# Patient Record
Sex: MALE | Race: WHITE | Employment: UNEMPLOYED | URBAN - METROPOLITAN AREA
[De-identification: names, ages, dates, MRNs, and addresses within clinical notes are randomized per-mention and may not be internally consistent; named-entity substitution may affect disease eponyms.]

---

## 2019-04-30 ENCOUNTER — APPOINTMENT (OUTPATIENT)
Dept: ULTRASOUND IMAGING | Facility: CLINIC | Age: 39
DRG: 194 | End: 2019-04-30
Attending: HOSPITALIST
Payer: COMMERCIAL

## 2019-04-30 ENCOUNTER — APPOINTMENT (OUTPATIENT)
Dept: GENERAL RADIOLOGY | Facility: CLINIC | Age: 39
DRG: 194 | End: 2019-04-30
Attending: EMERGENCY MEDICINE
Payer: COMMERCIAL

## 2019-04-30 ENCOUNTER — APPOINTMENT (OUTPATIENT)
Dept: CT IMAGING | Facility: CLINIC | Age: 39
DRG: 194 | End: 2019-04-30
Attending: EMERGENCY MEDICINE
Payer: COMMERCIAL

## 2019-04-30 ENCOUNTER — HOSPITAL ENCOUNTER (INPATIENT)
Facility: CLINIC | Age: 39
LOS: 4 days | Discharge: HOME | DRG: 194 | End: 2019-05-04
Attending: EMERGENCY MEDICINE | Admitting: HOSPITALIST
Payer: COMMERCIAL

## 2019-04-30 DIAGNOSIS — E86.0 DEHYDRATION: ICD-10-CM

## 2019-04-30 DIAGNOSIS — J18.9 PNEUMONIA OF RIGHT LOWER LOBE DUE TO INFECTIOUS ORGANISM: ICD-10-CM

## 2019-04-30 DIAGNOSIS — J18.9 COMMUNITY ACQUIRED PNEUMONIA OF RIGHT LUNG, UNSPECIFIED PART OF LUNG: Primary | ICD-10-CM

## 2019-04-30 DIAGNOSIS — R09.02 HYPOXIA: ICD-10-CM

## 2019-04-30 LAB
ALBUMIN UR-MCNC: NEGATIVE MG/DL
AMPHETAMINES UR QL SCN: NEGATIVE
ANION GAP SERPL CALCULATED.3IONS-SCNC: 7 MMOL/L (ref 3–14)
APPEARANCE UR: CLEAR
BARBITURATES UR QL: NEGATIVE
BASOPHILS # BLD AUTO: 0 10E9/L (ref 0–0.2)
BASOPHILS NFR BLD AUTO: 0.3 %
BENZODIAZ UR QL: NEGATIVE
BILIRUB UR QL STRIP: NEGATIVE
BUN SERPL-MCNC: 15 MG/DL (ref 7–30)
CALCIUM SERPL-MCNC: 8.6 MG/DL (ref 8.5–10.1)
CANNABINOIDS UR QL SCN: POSITIVE
CHLORIDE SERPL-SCNC: 106 MMOL/L (ref 94–109)
CO2 SERPL-SCNC: 27 MMOL/L (ref 20–32)
COCAINE UR QL: POSITIVE
COLOR UR AUTO: NORMAL
CORTIS SERPL-MCNC: 64.8 UG/DL (ref 4–22)
CREAT SERPL-MCNC: 1.28 MG/DL (ref 0.66–1.25)
D DIMER PPP FEU-MCNC: 0.7 UG/ML FEU (ref 0–0.5)
DIFFERENTIAL METHOD BLD: ABNORMAL
EOSINOPHIL # BLD AUTO: 0.1 10E9/L (ref 0–0.7)
EOSINOPHIL NFR BLD AUTO: 1.6 %
ERYTHROCYTE [DISTWIDTH] IN BLOOD BY AUTOMATED COUNT: 12.2 % (ref 10–15)
FLUAV+FLUBV AG SPEC QL: NEGATIVE
FLUAV+FLUBV AG SPEC QL: NEGATIVE
GFR SERPL CREATININE-BSD FRML MDRD: 69 ML/MIN/{1.73_M2}
GLUCOSE SERPL-MCNC: 91 MG/DL (ref 70–99)
GLUCOSE UR STRIP-MCNC: NEGATIVE MG/DL
HCT VFR BLD AUTO: 40.5 % (ref 40–53)
HGB BLD-MCNC: 14.1 G/DL (ref 13.3–17.7)
HGB UR QL STRIP: NEGATIVE
IMM GRANULOCYTES # BLD: 0 10E9/L (ref 0–0.4)
IMM GRANULOCYTES NFR BLD: 0.3 %
KETONES UR STRIP-MCNC: NEGATIVE MG/DL
LACTATE BLD-SCNC: 1.5 MMOL/L (ref 0.7–2)
LEUKOCYTE ESTERASE UR QL STRIP: NEGATIVE
LYMPHOCYTES # BLD AUTO: 2.3 10E9/L (ref 0.8–5.3)
LYMPHOCYTES NFR BLD AUTO: 25.4 %
MCH RBC QN AUTO: 32.6 PG (ref 26.5–33)
MCHC RBC AUTO-ENTMCNC: 34.8 G/DL (ref 31.5–36.5)
MCV RBC AUTO: 94 FL (ref 78–100)
MONOCYTES # BLD AUTO: 0.4 10E9/L (ref 0–1.3)
MONOCYTES NFR BLD AUTO: 4.9 %
NEUTROPHILS # BLD AUTO: 6 10E9/L (ref 1.6–8.3)
NEUTROPHILS NFR BLD AUTO: 67.5 %
NITRATE UR QL: NEGATIVE
NRBC # BLD AUTO: 0 10*3/UL
NRBC BLD AUTO-RTO: 0 /100
OPIATES UR QL SCN: NEGATIVE
PCP UR QL SCN: NEGATIVE
PH UR STRIP: 5.5 PH (ref 5–7)
PLATELET # BLD AUTO: 131 10E9/L (ref 150–450)
POTASSIUM SERPL-SCNC: 3.4 MMOL/L (ref 3.4–5.3)
PROCALCITONIN SERPL-MCNC: 0.05 NG/ML
RBC # BLD AUTO: 4.32 10E12/L (ref 4.4–5.9)
SODIUM SERPL-SCNC: 140 MMOL/L (ref 133–144)
SOURCE: NORMAL
SP GR UR STRIP: 1.02 (ref 1–1.03)
SPECIMEN SOURCE: NORMAL
T4 FREE SERPL-MCNC: 0.63 NG/DL (ref 0.76–1.46)
TSH SERPL DL<=0.005 MIU/L-ACNC: 0.34 MU/L (ref 0.4–4)
UROBILINOGEN UR STRIP-MCNC: NORMAL MG/DL (ref 0–2)
WBC # BLD AUTO: 9 10E9/L (ref 4–11)

## 2019-04-30 PROCEDURE — 99223 1ST HOSP IP/OBS HIGH 75: CPT | Mod: AI | Performed by: HOSPITALIST

## 2019-04-30 PROCEDURE — 71046 X-RAY EXAM CHEST 2 VIEWS: CPT

## 2019-04-30 PROCEDURE — 80307 DRUG TEST PRSMV CHEM ANLYZR: CPT | Performed by: EMERGENCY MEDICINE

## 2019-04-30 PROCEDURE — 82533 TOTAL CORTISOL: CPT | Performed by: HOSPITALIST

## 2019-04-30 PROCEDURE — 96375 TX/PRO/DX INJ NEW DRUG ADDON: CPT

## 2019-04-30 PROCEDURE — 84443 ASSAY THYROID STIM HORMONE: CPT | Performed by: HOSPITALIST

## 2019-04-30 PROCEDURE — 71260 CT THORAX DX C+: CPT

## 2019-04-30 PROCEDURE — 94640 AIRWAY INHALATION TREATMENT: CPT

## 2019-04-30 PROCEDURE — 25000125 ZZHC RX 250: Performed by: HOSPITALIST

## 2019-04-30 PROCEDURE — 25000128 H RX IP 250 OP 636: Performed by: EMERGENCY MEDICINE

## 2019-04-30 PROCEDURE — 12000000 ZZH R&B MED SURG/OB

## 2019-04-30 PROCEDURE — 25000128 H RX IP 250 OP 636: Performed by: HOSPITALIST

## 2019-04-30 PROCEDURE — 96367 TX/PROPH/DG ADDL SEQ IV INF: CPT

## 2019-04-30 PROCEDURE — 87205 SMEAR GRAM STAIN: CPT | Performed by: HOSPITALIST

## 2019-04-30 PROCEDURE — 40000274 ZZH STATISTIC RCP CONSULT EA 30 MIN

## 2019-04-30 PROCEDURE — 25000131 ZZH RX MED GY IP 250 OP 636 PS 637: Performed by: EMERGENCY MEDICINE

## 2019-04-30 PROCEDURE — 36415 COLL VENOUS BLD VENIPUNCTURE: CPT | Performed by: HOSPITALIST

## 2019-04-30 PROCEDURE — 84439 ASSAY OF FREE THYROXINE: CPT | Performed by: HOSPITALIST

## 2019-04-30 PROCEDURE — 25000125 ZZHC RX 250: Performed by: EMERGENCY MEDICINE

## 2019-04-30 PROCEDURE — 87804 INFLUENZA ASSAY W/OPTIC: CPT | Performed by: EMERGENCY MEDICINE

## 2019-04-30 PROCEDURE — 87040 BLOOD CULTURE FOR BACTERIA: CPT | Performed by: EMERGENCY MEDICINE

## 2019-04-30 PROCEDURE — 87070 CULTURE OTHR SPECIMN AEROBIC: CPT | Performed by: HOSPITALIST

## 2019-04-30 PROCEDURE — 81003 URINALYSIS AUTO W/O SCOPE: CPT | Performed by: EMERGENCY MEDICINE

## 2019-04-30 PROCEDURE — 85379 FIBRIN DEGRADATION QUANT: CPT | Performed by: EMERGENCY MEDICINE

## 2019-04-30 PROCEDURE — 99285 EMERGENCY DEPT VISIT HI MDM: CPT | Mod: 25

## 2019-04-30 PROCEDURE — 25000132 ZZH RX MED GY IP 250 OP 250 PS 637: Performed by: HOSPITALIST

## 2019-04-30 PROCEDURE — 25800030 ZZH RX IP 258 OP 636: Performed by: EMERGENCY MEDICINE

## 2019-04-30 PROCEDURE — 25000132 ZZH RX MED GY IP 250 OP 250 PS 637: Performed by: EMERGENCY MEDICINE

## 2019-04-30 PROCEDURE — 82024 ASSAY OF ACTH: CPT | Performed by: HOSPITALIST

## 2019-04-30 PROCEDURE — 84145 PROCALCITONIN (PCT): CPT | Performed by: EMERGENCY MEDICINE

## 2019-04-30 PROCEDURE — 85025 COMPLETE CBC W/AUTO DIFF WBC: CPT | Performed by: EMERGENCY MEDICINE

## 2019-04-30 PROCEDURE — 96365 THER/PROPH/DIAG IV INF INIT: CPT | Mod: 59

## 2019-04-30 PROCEDURE — 96361 HYDRATE IV INFUSION ADD-ON: CPT

## 2019-04-30 PROCEDURE — 80048 BASIC METABOLIC PNL TOTAL CA: CPT | Performed by: EMERGENCY MEDICINE

## 2019-04-30 PROCEDURE — 94640 AIRWAY INHALATION TREATMENT: CPT | Mod: 76

## 2019-04-30 PROCEDURE — 93970 EXTREMITY STUDY: CPT

## 2019-04-30 PROCEDURE — 40000275 ZZH STATISTIC RCP TIME EA 10 MIN

## 2019-04-30 PROCEDURE — 25800030 ZZH RX IP 258 OP 636: Performed by: HOSPITALIST

## 2019-04-30 PROCEDURE — 83605 ASSAY OF LACTIC ACID: CPT | Performed by: EMERGENCY MEDICINE

## 2019-04-30 RX ORDER — CEFTRIAXONE 2 G/1
2 INJECTION, POWDER, FOR SOLUTION INTRAMUSCULAR; INTRAVENOUS EVERY 24 HOURS
Status: DISCONTINUED | OUTPATIENT
Start: 2019-05-01 | End: 2019-05-03

## 2019-04-30 RX ORDER — LEVOTHYROXINE SODIUM 100 UG/1
200 TABLET ORAL
Status: DISCONTINUED | OUTPATIENT
Start: 2019-05-01 | End: 2019-05-04 | Stop reason: HOSPADM

## 2019-04-30 RX ORDER — IPRATROPIUM BROMIDE AND ALBUTEROL SULFATE 2.5; .5 MG/3ML; MG/3ML
3 SOLUTION RESPIRATORY (INHALATION) ONCE
Status: COMPLETED | OUTPATIENT
Start: 2019-04-30 | End: 2019-04-30

## 2019-04-30 RX ORDER — ACETAMINOPHEN 325 MG/1
650 TABLET ORAL EVERY 4 HOURS PRN
Status: DISCONTINUED | OUTPATIENT
Start: 2019-04-30 | End: 2019-05-04 | Stop reason: HOSPADM

## 2019-04-30 RX ORDER — SODIUM CHLORIDE 9 MG/ML
1000 INJECTION, SOLUTION INTRAVENOUS CONTINUOUS
Status: DISCONTINUED | OUTPATIENT
Start: 2019-04-30 | End: 2019-05-03

## 2019-04-30 RX ORDER — LEVOTHYROXINE SODIUM 200 UG/1
200 TABLET ORAL
COMMUNITY

## 2019-04-30 RX ORDER — AMOXICILLIN 250 MG
2 CAPSULE ORAL 2 TIMES DAILY PRN
Status: DISCONTINUED | OUTPATIENT
Start: 2019-04-30 | End: 2019-05-04 | Stop reason: HOSPADM

## 2019-04-30 RX ORDER — CEFTRIAXONE 2 G/1
2 INJECTION, POWDER, FOR SOLUTION INTRAMUSCULAR; INTRAVENOUS ONCE
Status: COMPLETED | OUTPATIENT
Start: 2019-04-30 | End: 2019-04-30

## 2019-04-30 RX ORDER — ONDANSETRON 4 MG/1
4 TABLET, ORALLY DISINTEGRATING ORAL EVERY 6 HOURS PRN
Status: DISCONTINUED | OUTPATIENT
Start: 2019-04-30 | End: 2019-05-04 | Stop reason: HOSPADM

## 2019-04-30 RX ORDER — ALBUTEROL SULFATE 0.83 MG/ML
3 SOLUTION RESPIRATORY (INHALATION)
Status: DISCONTINUED | OUTPATIENT
Start: 2019-04-30 | End: 2019-05-03

## 2019-04-30 RX ORDER — TESTOSTERONE CYPIONATE 200 MG/ML
200 INJECTION, SOLUTION INTRAMUSCULAR
COMMUNITY
Start: 2019-02-21

## 2019-04-30 RX ORDER — ONDANSETRON 2 MG/ML
4 INJECTION INTRAMUSCULAR; INTRAVENOUS EVERY 6 HOURS PRN
Status: DISCONTINUED | OUTPATIENT
Start: 2019-04-30 | End: 2019-05-04 | Stop reason: HOSPADM

## 2019-04-30 RX ORDER — IBUPROFEN 200 MG
200 TABLET ORAL EVERY 4 HOURS PRN
COMMUNITY

## 2019-04-30 RX ORDER — OXYCODONE AND ACETAMINOPHEN 5; 325 MG/1; MG/1
1 TABLET ORAL EVERY 4 HOURS PRN
Status: DISCONTINUED | OUTPATIENT
Start: 2019-04-30 | End: 2019-05-04 | Stop reason: HOSPADM

## 2019-04-30 RX ORDER — HYDROMORPHONE HYDROCHLORIDE 1 MG/ML
0.2 INJECTION, SOLUTION INTRAMUSCULAR; INTRAVENOUS; SUBCUTANEOUS
Status: DISCONTINUED | OUTPATIENT
Start: 2019-04-30 | End: 2019-05-04 | Stop reason: HOSPADM

## 2019-04-30 RX ORDER — SODIUM CHLORIDE 9 MG/ML
INJECTION, SOLUTION INTRAVENOUS CONTINUOUS
Status: DISCONTINUED | OUTPATIENT
Start: 2019-04-30 | End: 2019-05-01

## 2019-04-30 RX ORDER — GUAIFENESIN/DEXTROMETHORPHAN 100-10MG/5
10 SYRUP ORAL EVERY 4 HOURS PRN
Status: DISCONTINUED | OUTPATIENT
Start: 2019-04-30 | End: 2019-05-04 | Stop reason: HOSPADM

## 2019-04-30 RX ORDER — ACETAMINOPHEN 500 MG
1000 TABLET ORAL ONCE
Status: COMPLETED | OUTPATIENT
Start: 2019-04-30 | End: 2019-04-30

## 2019-04-30 RX ORDER — POLYETHYLENE GLYCOL 3350 17 G/17G
17 POWDER, FOR SOLUTION ORAL DAILY PRN
Status: DISCONTINUED | OUTPATIENT
Start: 2019-04-30 | End: 2019-05-04 | Stop reason: HOSPADM

## 2019-04-30 RX ORDER — NALOXONE HYDROCHLORIDE 0.4 MG/ML
.1-.4 INJECTION, SOLUTION INTRAMUSCULAR; INTRAVENOUS; SUBCUTANEOUS
Status: DISCONTINUED | OUTPATIENT
Start: 2019-04-30 | End: 2019-05-04 | Stop reason: HOSPADM

## 2019-04-30 RX ORDER — BISACODYL 10 MG
10 SUPPOSITORY, RECTAL RECTAL DAILY PRN
Status: DISCONTINUED | OUTPATIENT
Start: 2019-04-30 | End: 2019-05-04 | Stop reason: HOSPADM

## 2019-04-30 RX ORDER — LEVOTHYROXINE SODIUM 100 UG/1
100 TABLET ORAL
Status: DISCONTINUED | OUTPATIENT
Start: 2019-04-30 | End: 2019-04-30

## 2019-04-30 RX ORDER — ACETAMINOPHEN 650 MG/1
650 SUPPOSITORY RECTAL EVERY 4 HOURS PRN
Status: DISCONTINUED | OUTPATIENT
Start: 2019-04-30 | End: 2019-05-02

## 2019-04-30 RX ORDER — ONDANSETRON 4 MG/1
4 TABLET, ORALLY DISINTEGRATING ORAL ONCE
Status: COMPLETED | OUTPATIENT
Start: 2019-04-30 | End: 2019-04-30

## 2019-04-30 RX ORDER — AMOXICILLIN 250 MG
1 CAPSULE ORAL 2 TIMES DAILY PRN
Status: DISCONTINUED | OUTPATIENT
Start: 2019-04-30 | End: 2019-05-04 | Stop reason: HOSPADM

## 2019-04-30 RX ORDER — IPRATROPIUM BROMIDE AND ALBUTEROL SULFATE 2.5; .5 MG/3ML; MG/3ML
3 SOLUTION RESPIRATORY (INHALATION) EVERY 4 HOURS PRN
Status: DISCONTINUED | OUTPATIENT
Start: 2019-04-30 | End: 2019-05-03

## 2019-04-30 RX ORDER — HYDROCORTISONE 10 MG/1
TABLET ORAL
COMMUNITY

## 2019-04-30 RX ORDER — IOPAMIDOL 755 MG/ML
70 INJECTION, SOLUTION INTRAVASCULAR ONCE
Status: COMPLETED | OUTPATIENT
Start: 2019-04-30 | End: 2019-04-30

## 2019-04-30 RX ORDER — AZITHROMYCIN 500 MG/1
500 INJECTION, POWDER, LYOPHILIZED, FOR SOLUTION INTRAVENOUS EVERY 24 HOURS
Status: DISCONTINUED | OUTPATIENT
Start: 2019-04-30 | End: 2019-04-30

## 2019-04-30 RX ORDER — AZITHROMYCIN 500 MG/1
500 INJECTION, POWDER, LYOPHILIZED, FOR SOLUTION INTRAVENOUS ONCE
Status: COMPLETED | OUTPATIENT
Start: 2019-04-30 | End: 2019-04-30

## 2019-04-30 RX ORDER — FLUTICASONE PROPIONATE 50 MCG
1 SPRAY, SUSPENSION (ML) NASAL DAILY PRN
COMMUNITY

## 2019-04-30 RX ADMIN — GUAIFENESIN AND DEXTROMETHORPHAN 10 ML: 100; 10 SYRUP ORAL at 14:12

## 2019-04-30 RX ADMIN — OXYCODONE HYDROCHLORIDE AND ACETAMINOPHEN 1 TABLET: 5; 325 TABLET ORAL at 22:06

## 2019-04-30 RX ADMIN — SODIUM CHLORIDE: 9 INJECTION, SOLUTION INTRAVENOUS at 15:57

## 2019-04-30 RX ADMIN — HYDROCORTISONE SODIUM SUCCINATE 50 MG: 100 INJECTION, POWDER, FOR SOLUTION INTRAMUSCULAR; INTRAVENOUS at 21:35

## 2019-04-30 RX ADMIN — HYDROCORTISONE SODIUM SUCCINATE 100 MG: 100 INJECTION, POWDER, FOR SOLUTION INTRAMUSCULAR; INTRAVENOUS at 13:00

## 2019-04-30 RX ADMIN — ACETAMINOPHEN 650 MG: 325 TABLET, FILM COATED ORAL at 19:36

## 2019-04-30 RX ADMIN — SODIUM CHLORIDE, POTASSIUM CHLORIDE, SODIUM LACTATE AND CALCIUM CHLORIDE 1000 ML: 600; 310; 30; 20 INJECTION, SOLUTION INTRAVENOUS at 11:45

## 2019-04-30 RX ADMIN — IPRATROPIUM BROMIDE AND ALBUTEROL SULFATE 3 ML: .5; 3 SOLUTION RESPIRATORY (INHALATION) at 08:46

## 2019-04-30 RX ADMIN — ONDANSETRON 4 MG: 4 TABLET, ORALLY DISINTEGRATING ORAL at 08:44

## 2019-04-30 RX ADMIN — IOPAMIDOL 70 ML: 755 INJECTION, SOLUTION INTRAVENOUS at 11:22

## 2019-04-30 RX ADMIN — GUAIFENESIN AND DEXTROMETHORPHAN 10 ML: 100; 10 SYRUP ORAL at 19:36

## 2019-04-30 RX ADMIN — SODIUM CHLORIDE 1000 ML: 9 INJECTION, SOLUTION INTRAVENOUS at 09:34

## 2019-04-30 RX ADMIN — LEVOTHYROXINE SODIUM 100 MCG: 100 TABLET ORAL at 14:12

## 2019-04-30 RX ADMIN — CEFTRIAXONE SODIUM 2 G: 2 INJECTION, POWDER, FOR SOLUTION INTRAMUSCULAR; INTRAVENOUS at 12:15

## 2019-04-30 RX ADMIN — OXYCODONE HYDROCHLORIDE AND ACETAMINOPHEN 1 TABLET: 5; 325 TABLET ORAL at 14:12

## 2019-04-30 RX ADMIN — SODIUM CHLORIDE 1000 ML: 9 INJECTION, SOLUTION INTRAVENOUS at 10:05

## 2019-04-30 RX ADMIN — AZITHROMYCIN DIHYDRATE 500 MG: 500 INJECTION, POWDER, LYOPHILIZED, FOR SOLUTION INTRAVENOUS at 13:00

## 2019-04-30 RX ADMIN — SODIUM CHLORIDE 94 ML: 9 INJECTION, SOLUTION INTRAVENOUS at 11:28

## 2019-04-30 RX ADMIN — IPRATROPIUM BROMIDE AND ALBUTEROL SULFATE 3 ML: .5; 3 SOLUTION RESPIRATORY (INHALATION) at 19:58

## 2019-04-30 RX ADMIN — ACETAMINOPHEN 1000 MG: 500 TABLET, FILM COATED ORAL at 08:45

## 2019-04-30 ASSESSMENT — ENCOUNTER SYMPTOMS
RHINORRHEA: 0
FEVER: 1
WHEEZING: 1
SHORTNESS OF BREATH: 1
HEADACHES: 1
NAUSEA: 1
DIZZINESS: 1

## 2019-04-30 ASSESSMENT — ACTIVITIES OF DAILY LIVING (ADL)
ADLS_ACUITY_SCORE: 13
ADLS_ACUITY_SCORE: 13

## 2019-04-30 ASSESSMENT — MIFFLIN-ST. JEOR: SCORE: 1694.18

## 2019-04-30 NOTE — ED NOTES
MD notified of pt's dropping sats while lying down and temperature of 101.6 orally after receiving PO tylenol. Pt placed on 2 L NC.

## 2019-04-30 NOTE — ED PROVIDER NOTES
"  History     Chief Complaint:  Cough    HPI   Braden Izaguirre is a 38 year old male, current smoker, with history of thyroid disease, hypertension, hypercholesteremia, pituitary adenoma, diabetes, Cushing syndrome, MI amongst others as noted below, who presents alone for evaluation of multiple symptoms, including dry cough with associated chest pain and shortness of breath, nausea, generalized malaise, low grade fevers and wheezing for the last two days. Patient reports he began to experience dizziness this morning \"where I was falling over because I couldn't walk straight\", thus presented for evaluation.     Patient denies any runny nose.     Allergies:  No Known Drug Allergies     Medications:    Cortef  Ibuprofen  Levothyroxine  Percocet  Vitamin D    Past Medical History:    Cushing syndrome  Depression  Diabetes  Hypercholesterermia  Hypertension  MI  Pacemaker  Pituitary adenoma  PTSD  Thyroid disease  Vertigo    Past Surgical History:    Removal of kidney stone  Genital warts - removal   Implant automatic I,plantabl cardioverter defibrillator  Transphenoidal - pituitary adenoma resection    Family History:    Father - diabetes    Social History:  The patient was accompanied to the ED alone.  Smoking Status: Yes - current every day smoker  Smokeless Tobacco: No  Alcohol Use: Former - quit 3 years ago  Drug Use: Former - quit 3 years ago   Marital Status:  Single [1]     Review of Systems   Constitutional: Positive for fever.   HENT: Negative for rhinorrhea.    Respiratory: Positive for shortness of breath and wheezing.    Cardiovascular: Positive for chest pain.   Gastrointestinal: Positive for nausea.   Neurological: Positive for dizziness and headaches.   All other systems reviewed and are negative.      Physical Exam   Vitals:  Patient Vitals for the past 24 hrs:   BP Temp Temp src Pulse Resp SpO2 Height Weight   04/30/19 1230 94/60 -- -- 77 -- 100 % -- --   04/30/19 1215 99/64 -- -- 73 -- 100 % -- -- " "  04/30/19 1200 103/63 -- -- 79 -- 99 % -- --   04/30/19 1145 101/49 -- -- 80 -- 97 % -- --   04/30/19 1130 -- -- -- 96 -- -- -- --   04/30/19 1115 90/51 -- -- 80 -- 97 % -- --   04/30/19 1100 (!) 84/49 98.6  F (37  C) Oral 79 -- 97 % -- --   04/30/19 1045 98/51 -- -- 99 -- 99 % -- --   04/30/19 1030 98/59 -- -- 78 -- 96 % -- --   04/30/19 1015 96/62 -- -- 86 -- 96 % -- --   04/30/19 1000 99/70 -- -- 86 -- 96 % -- --   04/30/19 0945 -- -- -- -- -- 100 % -- --   04/30/19 0933 101/63 -- -- 91 -- 91 % -- --   04/30/19 0930 101/63 -- -- -- -- 91 % -- --   04/30/19 0918 -- 101.6  F (38.7  C) Oral -- -- 96 % -- --   04/30/19 0915 -- -- -- -- -- (!) 88 % -- --   04/30/19 0910 -- -- -- -- -- 94 % -- --   04/30/19 0900 -- -- -- -- -- 93 % -- --   04/30/19 0827 119/63 100.1  F (37.8  C) Oral 114 18 98 % 1.651 m (5' 5\") 85.7 kg (189 lb)      Physical Exam  Nursing note and vitals reviewed.  Constitutional:  Alert.  Appears well-developed and well-nourished, comfortable.   HENT:   Nose:    Nose normal  Mouth/Throat:  Oropharynx normal except mouth is dry.  Eyes:    Conjunctivae are normal.      Right eye exhibits no discharge. Left eye exhibits no discharge.   Cardiovascular:  Normal rate, regular rhythm.      Normal heart sounds.     No murmur heard.  Pulmonary/Chest:  Breath sounds normal. No respiratory distress.     No tenderness. No stridor.   GI:   No tenderness, no distention.  Lymph:   Normal without enlargement in the cervical chain  Neurological:   Alert and appropriate. No focal weakness.  Gait is normal.  Musculoskeletal: No focal weakness, no edema.  Skin:    Skin is warm and dry. No rash noted. No diaphoresis.      No erythema. No pallor.   Psychiatric:   Behavior is normal. Judgment and thought content normal.      Emergency Department Course     Imaging:  Radiology findings were communicated with the patient who voiced understanding of the findings.  XR Chest:  IMPRESSION: Heart size is normal. No pleural " effusion, pneumothorax,  or abnormal area of consolidation. Dual lead pacemaker in the left  hemithorax.  Reading per radiology.     CT Chest Pulmonary Embolism w Contrast:  IMPRESSION:  1. Probable right lower lobe pneumonia. Aspiration pneumonitis is  another possibility.  2. No evidence of pulmonary embolism or acute thoracic aortic  abnormality.  Reading per radiology.     Laboratory:  Laboratory findings were communicated with the patient who voiced understanding of the findings.  Influenza A/B Antigen: Negative    CBC:  (L) o/w WNL (WBC 9.0, HGB 14.1)  BMP: Creatinine 1.28 (H) o/w WNL  Lactic Acid (Collected at 0926): 1.5   Blood Cultures: Pending  D Dimer (Collected 0926): 0.7   Procalcitonin: Pending    UA reflex to Microscopic and Culture: [facility]  Drug abuse scree urine: Cannabinoids Qual Urine Positive (A), Cocaine Qual Urine Positive (A) o/w WNL     Interventions:  0844 Zofran 4 mg PO  0845 Tylenol 1000 mg PO  0846 Duoneb 3 mL Nebulization  0934 0.9% NaCl Bolus 1000 mL IV  1005 0.9% NaCl Infusion 1000 mL IV  1145 Lactated Ringers Bolus 1000 mL IV  1215 Rocephin 2 g IV  1300 Zithromax 500 mg IV  1300 Solu-cortef 100 mg IV     Emergency Department Course:  Nursing notes and vitals reviewed.  A nasal swab was obtained for laboratory testing, findings above.    The patient was sent for a XR Chest,CT Chest Pulmonary Embolism w Contrast while in the emergency department, results above.    IV was inserted and blood was drawn for laboratory testing, results above.   The patient provided a urine sample here in the emergency department. This was sent for laboratory testing, findings above.     0821   I performed an exam of the patient as documented above. History obtained from patient.     0919   RN updates that patient's O2 sats are dropping. Will order chest x-ray. Also reported that patient endorsed some black stools, though did not mention this during my initial interview with patient.    0944   Updated  patient regarding negative flu test. Will pursue further work up.    1111   Updated patient regarding results. CT is indicated.    1201   Updated patient regarding results. Discussed plan of care and patient is agreeable to admission. Patient reports some improvement with fluids.     1209   I spoke with Dr. Hopkins of the Hospitalist service regarding patient's presentation, findings, and plan of care. They will accept patient for further care and evaluation.     I discussed the treatment plan with the patient. They expressed understanding of this plan and consented to admission. I discussed the patient with Dr. Hopkins, who will admit the patient to a monitored bed for further evaluation and treatment.     I personally reviewed the laboratory results with the Patient and answered all related questions prior to admission.    Impression & Plan      Medical Decision Making:  He comes in feeling weak, lightheaded and coughing.  I thought initially probably had influenza, but influenza came back negative.  Labs were obtained including blood cultures and a lactic acid.  His white count is normal with a normal differential, lactic acid is normal, basic metabolic panel is normal.  Initial chest x-ray was unremarkable.  This did not explain his hypoxia with his saturations in the upper 80s on room air.  He states he has not taken any narcotics today.  I did get a d-dimer at the off chance this could be secondary to a blood clot and was slightly elevated at 0.7.  CT scan of the chest was then obtained and showed no blood clot, but does have a right lower lobe infiltrate.  This would explain his clinical picture.  I started Zithromax and Rocephin and the patient will be admitted for further work-up.  Interestingly his blood pressures dropped into the upper 80s at one-point.  He was given 2 L of fluids and now his pressures are in the low 100s.  His third liter is going in and he still has not urinated.  He seems to be quite  dehydrated and his lightheadedness has now resolved and is feeling overall better.  He is still requiring oxygen.  With the DuoNeb, he said did not really help.  I have admitted the patient Dr. Hopkins.  With the patient's history of Cushing's disease, he was given a stress dose of Solu-Cortef 100 mg IV.    Diagnosis:    ICD-10-CM    1. Pneumonia of right lower lobe due to infectious organism (H) J18.1 Procalcitonin     Procalcitonin     CANCELED: Procalcitonin   2. Hypoxia R09.02    3. Dehydration E86.0         Disposition:   Admission.  IV antibiotics, oxygen, fluids.    Scribe Disclosure:  Iman WOODALL, am serving as a scribe at 8:54 AM on 4/30/2019 to document services personally performed by Karis cM MD, based on my observations and the provider's statements to me.  4/30/2019    EMERGENCY DEPARTMENT       Karis Mc MD  04/30/19 9843

## 2019-04-30 NOTE — ED NOTES
"[facility]  ED Nurse Handoff Report    ED Chief complaint: Cough (cough and hoarseness since last night now feeling dizzy)      ED Diagnosis:   Final diagnoses:   Pneumonia of right lower lobe due to infectious organism (H)   Hypoxia   Dehydration       Code Status: Full Code    Allergies: No Known Allergies    Activity level - Baseline/Home:  Independent    Activity Level - Current:   Independent     Needed?: No    Isolation: No  Infection: Not Applicable  Bariatric?: No    Vital Signs:   Vitals:    04/30/19 1115 04/30/19 1130 04/30/19 1145 04/30/19 1200   BP: 90/51  101/49 103/63   Pulse: 80 96 80 79   Resp:       Temp:       TempSrc:       SpO2: 97%  97% 99%   Weight:       Height:           Cardiac Rhythm: ,        Pain level: 0-10 Pain Scale: 3    Is this patient confused?: No   Does this patient have a guardian?  No         If yes, is there guardianship documents in the Epic \"Code/ACP\" activity?  N/A         Guardian Notified?  N/A  [place] - Suicide Severity Rating Scale Completed?  Yes  If yes, what color did the patient score?  White    Patient Report: Initial Complaint: cough- Pt arrived to the ED from home after developing a cough and fever on Sunday. Pt reports fever worse overnight.   Focused Assessment: Resp- cough. Pt dropped o2 saturations when lying flat. Pt plaed on 2L nc and sats mid 90s  Cardiac- Pt initially tachycardic and hypotensive. Pt received 3L fluids- 2L NS and 1L LR. Pt has pacemaker in place  Neuro- WNL  GI- initially nauseated  -WNL  Tests Performed: labs, BCx2, cxr, CT of chest UA  Abnormal Results: Ct showed pneumonia.   Treatments provided: IVF as above, rocephin and zithromax    Family Comments:     OBS brochure/video discussed/provided to patient/family: N/A              Name of person given brochure if not patient:               Relationship to patient:     ED Medications:   Medications   0.9% sodium chloride BOLUS (0 mLs Intravenous Stopped " 4/30/19 1004)     Followed by   sodium chloride 0.9% infusion (0 mLs Intravenous Stopped 4/30/19 1145)   azithromycin (ZITHROMAX) 500 mg vial to attach to  mL bag (has no administration in time range)   cefTRIAXone (ROCEPHIN) 2 g vial to attach to  ml bag for ADULTS or NS 50 ml bag for PEDS (2 g Intravenous New Bag 4/30/19 1215)   ondansetron (ZOFRAN-ODT) ODT tab 4 mg (4 mg Oral Given 4/30/19 0844)   acetaminophen (TYLENOL) tablet 1,000 mg (1,000 mg Oral Given 4/30/19 0845)   ipratropium - albuterol 0.5 mg/2.5 mg/3 mL (DUONEB) neb solution 3 mL (3 mLs Nebulization Given 4/30/19 0846)   lactated ringers BOLUS 1,000 mL (1,000 mLs Intravenous New Bag 4/30/19 1145)   iopamidol (ISOVUE-370) solution 70 mL (70 mLs Intravenous Given 4/30/19 1122)   Saline Flush (94 mLs Intravenous Given 4/30/19 1128)       Drips infusing?:  Yes    For the majority of the shift this patient was Green.   Interventions performed were .    Severe Sepsis OR Septic Shock Diagnosis Present: No    To be done/followed up on inpatient unit:      ED NURSE PHONE NUMBER: *[phone]

## 2019-04-30 NOTE — PLAN OF CARE
DATE & TIME: 4/30/19 [id number]  ORIENTATION: A/Ox4  BEHAVIOR & AGGRESSION TOOL COLOR: Green  CIWA SCORE: NA  ABNL VS/O2: VSS; BP running low, last /57, OBP negative. Pt does c/o dizziness with standing, but overall feelings better than this morning. Other VSS on RA. Infrequent cough, congested nonproductive. I/S educated and encouraged.  MOBILITY: Up SBA, uses urinal at bedside/ambulated to bathroom.   PAIN MANAGMENT: Sternal pain decreased to 4/10 with percocet.  DIET: Low Na no caffeine  BOWEL/BLADDER: Uses urinal at bedside/bathroom. 1 watery brown stool this evening.  ABNL LAB/BG: D-dimer 0.7, US neg for DVT, T4 Free 0.63, TSH 0.34, cortisol serum 64.8, cocaine and cannabinoid positive.  DRAIN/DEVICES: PIV IVF NS at 75ml/hr.  TELEMETRY RHYTHM: NSR  SKIN: Intact  TESTS/PROCEDURES:   D/C DAY/GOALS/PLACE: Pending  OTHER IMPORTANT INFO: Sputum to be collected, no production yet.

## 2019-04-30 NOTE — PROGRESS NOTES
RECEIVING UNIT ED HANDOFF REVIEW    ED Nurse Handoff Report was reviewed by: Brittaney Menjivar on April 30, 2019 at 12:43 PM

## 2019-04-30 NOTE — PROVIDER NOTIFICATION
MD Notification    Notified Person: MD    Notified Person Name: Sandeep    Notification Date/Time: 4/30/19 1400    Notification Interaction: Talked to MD    Purpose of Notification: Constant sharp sternum pain rated 8/10, worse with coughing    Orders Received: If worse with coughing/pressing on sternum, administer pain medication, no concern for heart    Comments: Pt states pain is worse with coughing, no change with pressing on sternum, nurse administered pain medications/robitussin.

## 2019-04-30 NOTE — PLAN OF CARE
Admission    Patient arrives to room 637-2 via cart from ED.  Care plan note: DATE & TIME: 4/30/19 1430  ORIENTATION: A/Ox4  BEHAVIOR & AGGRESSION TOOL COLOR: Green  CIWA SCORE: NA  ABNL VS/O2: VSS; BP running low, last /66, OBP negative. Pt does c/o dizziness with standing. Other VSS on 2L at 95%. Infrequent cough, congested nonproductive.  MOBILITY: Up SBA, uses urinal at bedside.  PAIN MANAGMENT: Percocet for sternal pain rated 7-8/10, MD aware of this pain which is worse with coughing, no concern for heart.   DIET: Low Na no caffeine  BOWEL/BLADDER: Uses urinal at bedside, last BM this AM per pt.  ABNL LAB/BG: D-dimer 0.7, US ordered to r/u DVT  DRAIN/DEVICES: PIV IVF NS at 75ml/hr.  TELEMETRY RHYTHM: SR with PVCs.  SKIN: Intact  TESTS/PROCEDURES: US ordered to r/u DVT  D/C DAY/GOALS/PLACE: Pending  OTHER IMPORTANT INFO: Sputum to be collected, no production yet.        Inpatient nursing criteria listed below were met:    PCD's Documented: Yes  Skin issues/needs documented :NA  Isolation education started/completed NA  Patient allergies verified with patient: Yes  Verified completion of [place] Risk Assessment Tool:  Yes  Verified completion of Guardianship screening tool: Yes  Fall Prevention: Care plan updated, Education given and documented Yes  Care Plan initiated: Yes  Home medications documented in belongings flowsheet: NA  Patient belongings documented in belongings flowsheet: Yes  Reminder note (belongings/ medications) placed in discharge instructions:NA  Admission profile/ required documentation complete: Yes

## 2019-04-30 NOTE — H&P
[facility]    History and Physical  Hospitalist       Date of Admission:  4/30/2019  Date of Service (when I saw the patient): 04/30/19    Assessment & Plan   Braden Izaguirre is a 38 year old male who presents with cough.  Admitted for further evaluation and treatment.    Suspected community-acquired pneumonia: Patient reporting cough on admission, in addition to associated chest pain with coughing, dry throat, shortness of breath, nausea, malaise, fatigue, low-grade fever for the past 2 days prior to admission, as well as lightheadedness and near syncope during exertion.  Lactic acid is normal at 1.5 on admission.  White blood cell count is normal at 9.0 on admission.  Influenza A and B testing initially negative.  Chest x-ray was completed showing no evidence for pleural effusion, pneumothorax, or abnormal area of consolidation, however, dual-lead pacemaker in the left hemithorax noted.  CT of the chest reveals probable right lower lobe pneumonia, aspiration pneumonitis is another possibility, per report, no evidence of pulmonary embolism or acute thoracic aortic abnormality, per report.  -Blood cultures x2 pending.  -Procalcitonin pending.  -Initiated on ceftriaxone and azithromycin in the emergency department.  -Pulmonary hygiene.  -Close hemodynamic monitoring.  -IV fluids.    Presyncope: Patient reporting dizziness on standing.   -IVF.   -Consider CT head pending clinical course.   -Orthostatics, consider echo as clinically indicated.     Cardiac, dual-lead pacemaker: Patient with chest x-ray identification of dual-lead pacemaker in the left hemithorax.  -Monitor closely.    H/o Drug Abuse: Patient with drug abuse history.   -UDS pending.   -Consider CD consult.     Thrombocytopenia: Patient with a count of 131 on admission.  No reports of external blood loss.  -Monitor.    Acute kidney injury, stage I: Patient with a creatinine of 1.28 on admission.  -Avoid nephrotoxins.  -Close  monitoring.    Positive d-dimer: Patient with d-dimer of 0.7 on admission.  CTA of the chest completed with no evidence for pulmonary embolism.  -Bilateral lower extremity ultrasound to rule out DVT.    H/o Cushings: Patient maintained on hydrocortisone prior to admission.   -Cortisol level.  -ACTH  -Hold prior to admission hydrocortisone.   -AM Cortisol.  -Stress dose steroids for 24 hours then resume home dosing as clinically indicated.     Hypothyroidism: Patient maintained on levothyroxine as an outpatient.  -TSH with reflex free T4.  -Continue prior to admission levothyroxine.    DVT Prophylaxis: Pneumatic Compression Devices  Code Status: Full Code    Disposition: Inpatient status.    Dr. Trell Hopkins D.O.  [facility] Hospitalist  Pager [phone]    Primary Care Physician   Pranav Pagan    Chief Complaint   Cough    History is obtained from the patient and medical records.     History of Present Illness   Braden Izaguirre is a 38 year old male who presents with cough.  Admitted for further evaluation and treatment. Patient reporting cough on admission, in addition to associated chest pain with coughing, dry throat, shortness of breath, nausea, malaise, fatigue, low-grade fever for the past 2 days prior to admission, as well as lightheadedness and near syncope during exertion.  Lactic acid is normal at 1.5 on admission.  White blood cell count is normal at 9.0 on admission.  Influenza a and B testing initially negative.  Chest x-ray was completed showing no evidence for pleural effusion, pneumothorax, or abnormal area of consolidation, however, dual-lead pacemaker in the left hemithorax noted.  CT of the chest reveals probable right lower lobe pneumonia, aspiration pneumonitis is another possibility, per report, no evidence of pulmonary embolism or acute thoracic aortic abnormality, per report. Patient states he has felt very lightheaded when standing up.     Past Medical History    I have reviewed  this patient's medical history and updated it with pertinent information if needed.   Past Medical History:   Diagnosis Date     Cushing syndrome (H)      Depression      Diabetes (H)      Hypercholesteremia      Hypertension      Myocardial infarction (H)      Pacemaker      Pituitary adenoma (H)      Post-traumatic stress syndrome      Thyroid disease      Vertigo        Past Surgical History   I have reviewed this patient's surgical history and updated it with pertinent information if needed.  Past Surgical History:   Procedure Laterality Date     C REMOVAL OF KIDNEY STONE       genital warts      removal     IMPLANT AUTOMATIC IMPLANTABLE CARDIOVERTER DEFIBRILLATOR       transphenoidal      pituitary adenoma resection       Prior to Admission Medications   Prior to Admission Medications   Prescriptions Last Dose Informant Patient Reported? Taking?   hydrocortisone (CORTEF) 10 MG tablet   Yes Yes   Sig: Take 10 mg by mouth daily   ibuprofen (ADVIL/MOTRIN) 200 MG tablet   Yes Yes   Sig: Take 200 mg by mouth every 4 hours as needed for mild pain   levothyroxine (SYNTHROID/LEVOTHROID) 100 MCG tablet   Yes Yes   Sig: Take 100 mcg by mouth daily   oxyCODONE-acetaminophen (PERCOCET) 5-325 MG per tablet   Yes No   Sig: Take  by mouth every 4 hours as needed.   vitamin D (ERGOCALCIFEROL) 42260 UNIT capsule   Yes No   Sig: Take 50,000 Units by mouth once a week.      Facility-Administered Medications: None     Allergies   No Known Allergies    Social History   I have reviewed this patient's social history and updated it with pertinent information if needed. Braden Izaguirre  reports that he has been smoking.  He has a 10.00 pack-year smoking history. He has never used smokeless tobacco. He reports that he does not drink alcohol or use drugs.    Family History   I have reviewed this patient's family history and updated it with pertinent information if needed.   Family History   Problem Relation Age of Onset     Diabetes Father         Review of Systems   The 10 point Review of Systems is negative other than noted in the HPI or here.     Physical Exam   Temp: 98.6  F (37  C) Temp src: Oral BP: 101/49 Pulse: 80   Resp: 18 SpO2: 97 % O2 Device: Nasal cannula Oxygen Delivery: 2 LPM  Vital Signs with Ranges  Temp:  [98.6  F (37  C)-101.6  F (38.7  C)] 98.6  F (37  C)  Pulse:  [] 80  Resp:  [18] 18  BP: ()/(49-70) 101/49  SpO2:  [88 %-100 %] 97 %  189 lbs 0 oz    GENERAL: Alert and oriented. NAD. Conversational, appropriate. Cushingoid appearance.   HEENT: Normocephalic. EOMI. No icterus or injection. Nares normal.   LUNGS: Decrement to bases. No dyspnea at rest. NC in place.   HEART: Regular rate. Extremities perfused.   ABDOMEN: Soft, nontender, and nondistended. Positive bowel sounds.   EXTREMITIES: No LE edema noted.   NEUROLOGIC: Moves extremities x4 on command. No acute focal neurologic abnormalities noted.     Data   Data reviewed today:  I personally reviewed both laboratory and imaging data.   Recent Labs   Lab 04/30/19  0926   WBC 9.0   HGB 14.1   MCV 94   *      POTASSIUM 3.4   CHLORIDE 106   CO2 27   BUN 15   CR 1.28*   ANIONGAP 7   DIGNA 8.6   GLC 91       Recent Results (from the past 24 hour(s))   XR Chest 2 Views    Narrative    CHEST TWO VIEWS April 30, 2019 9:55 AM     HISTORY: 38-year-old patient with cough, fever, and hypoxia.    COMPARISON: None.       Impression    IMPRESSION: Heart size is normal. No pleural effusion, pneumothorax,  or abnormal area of consolidation. Dual lead pacemaker in the left  hemithorax.    SALONI PAGE MD   CT Chest Pulmonary Embolism w Contrast    Narrative    CT CHEST PULMONARY EMBOLISM WITH CONTRAST   4/30/2019 11:31 AM     HISTORY: Fever, hypoxia, normal chest x-ray, mildly elevated D-dimer.    TECHNIQUE: 70 mL Isovue-370. Radiation dose for this scan was reduced  using automated exposure control, adjustment of the mA and/or kV  according to patient size, or iterative  reconstruction technique.    COMPARISON: Chest x-ray obtained same day.    FINDINGS: No evidence of pulmonary embolism or acute thoracic aortic  abnormality. Heart size is normal. No significant coronary  calcifications. There is a left-sided pacer device present.    No pneumothorax. No pleural or pericardial effusion. There is abnormal  airspace and groundglass opacity in the right lower lobe. No  pathologically enlarged thoracic or axillary lymph nodes. No pulmonary  nodule or mass.      Impression    IMPRESSION:  1. Probable right lower lobe pneumonia. Aspiration pneumonitis is  another possibility.  2. No evidence of pulmonary embolism or acute thoracic aortic  abnormality.    WHITNEY JOHNSON MD

## 2019-04-30 NOTE — PHARMACY-ADMISSION MEDICATION HISTORY
Admission medication history interview status for the 4/30/2019  admission is complete. See EPIC admission navigator for prior to admission medications     Medication history source reliability:Good    Actions taken by pharmacist (provider contacted, etc):None     Additional medication history information not noted on PTA med list :Patient interview and pharmacy fill history at [facility] ([phone])    Medication reconciliation/reorder completed by provider prior to medication history? Yes    Time spent in this activity: 5    Prior to Admission medications    Medication Sig Last Dose Taking? Auth Provider   fluticasone (FLONASE) 50 MCG/ACT nasal spray Spray 1 spray into both nostrils daily as needed for rhinitis or allergies  Yes Unknown, Entered By History   hydrocortisone (CORTEF) 10 MG tablet Take four tablets (40 mg) by mouth every morning and two (20 mg) every afternoon at 4 pm. 4/29/2019 at PM Yes Reported, Patient   ibuprofen (ADVIL/MOTRIN) 200 MG tablet Take 200 mg by mouth every 4 hours as needed for mild pain  at PRN Yes Reported, Patient   levothyroxine (SYNTHROID/LEVOTHROID) 200 MCG tablet Take 200 mcg by mouth every morning (before breakfast)  4/29/2019 at Unknown time Yes Reported, Patient   testosterone cypionate (DEPO-TESTOSTERONE) 200 MG/ML injection Inject 200 mg into the muscle every 14 days 4/26/2019 Yes Unknown, Entered By History

## 2019-05-01 LAB
ACTH PLAS-MCNC: <10 PG/ML
ALBUMIN SERPL-MCNC: 3.1 G/DL (ref 3.4–5)
ALP SERPL-CCNC: 61 U/L (ref 40–150)
ALT SERPL W P-5'-P-CCNC: 37 U/L (ref 0–70)
ANION GAP SERPL CALCULATED.3IONS-SCNC: 10 MMOL/L (ref 3–14)
AST SERPL W P-5'-P-CCNC: 26 U/L (ref 0–45)
BASOPHILS # BLD AUTO: 0 10E9/L (ref 0–0.2)
BASOPHILS NFR BLD AUTO: 0.3 %
BILIRUB SERPL-MCNC: 0.2 MG/DL (ref 0.2–1.3)
BUN SERPL-MCNC: 10 MG/DL (ref 7–30)
CALCIUM SERPL-MCNC: 7.9 MG/DL (ref 8.5–10.1)
CHLORIDE SERPL-SCNC: 109 MMOL/L (ref 94–109)
CO2 SERPL-SCNC: 23 MMOL/L (ref 20–32)
CORTIS SERPL-MCNC: 36.6 UG/DL (ref 4–22)
CREAT SERPL-MCNC: 0.93 MG/DL (ref 0.66–1.25)
DIFFERENTIAL METHOD BLD: ABNORMAL
EOSINOPHIL # BLD AUTO: 0 10E9/L (ref 0–0.7)
EOSINOPHIL NFR BLD AUTO: 0 %
ERYTHROCYTE [DISTWIDTH] IN BLOOD BY AUTOMATED COUNT: 12.5 % (ref 10–15)
GFR SERPL CREATININE-BSD FRML MDRD: >90 ML/MIN/{1.73_M2}
GLUCOSE SERPL-MCNC: 194 MG/DL (ref 70–99)
GRAM STN SPEC: ABNORMAL
HCT VFR BLD AUTO: 32.5 % (ref 40–53)
HGB BLD-MCNC: 11.3 G/DL (ref 13.3–17.7)
IMM GRANULOCYTES # BLD: 0 10E9/L (ref 0–0.4)
IMM GRANULOCYTES NFR BLD: 0.4 %
LYMPHOCYTES # BLD AUTO: 1 10E9/L (ref 0.8–5.3)
LYMPHOCYTES NFR BLD AUTO: 14.8 %
Lab: ABNORMAL
MCH RBC QN AUTO: 33 PG (ref 26.5–33)
MCHC RBC AUTO-ENTMCNC: 34.8 G/DL (ref 31.5–36.5)
MCV RBC AUTO: 95 FL (ref 78–100)
MONOCYTES # BLD AUTO: 0.4 10E9/L (ref 0–1.3)
MONOCYTES NFR BLD AUTO: 5.1 %
NEUTROPHILS # BLD AUTO: 5.5 10E9/L (ref 1.6–8.3)
NEUTROPHILS NFR BLD AUTO: 79.4 %
NRBC # BLD AUTO: 0 10*3/UL
NRBC BLD AUTO-RTO: 0 /100
PLATELET # BLD AUTO: 124 10E9/L (ref 150–450)
POTASSIUM SERPL-SCNC: 3.3 MMOL/L (ref 3.4–5.3)
POTASSIUM SERPL-SCNC: 4 MMOL/L (ref 3.4–5.3)
PROT SERPL-MCNC: 5.8 G/DL (ref 6.8–8.8)
RBC # BLD AUTO: 3.42 10E12/L (ref 4.4–5.9)
SODIUM SERPL-SCNC: 142 MMOL/L (ref 133–144)
SPECIMEN SOURCE: ABNORMAL
WBC # BLD AUTO: 6.9 10E9/L (ref 4–11)

## 2019-05-01 PROCEDURE — 36415 COLL VENOUS BLD VENIPUNCTURE: CPT | Performed by: HOSPITALIST

## 2019-05-01 PROCEDURE — 25000128 H RX IP 250 OP 636: Performed by: HOSPITALIST

## 2019-05-01 PROCEDURE — 84132 ASSAY OF SERUM POTASSIUM: CPT | Performed by: HOSPITALIST

## 2019-05-01 PROCEDURE — 94640 AIRWAY INHALATION TREATMENT: CPT

## 2019-05-01 PROCEDURE — 99232 SBSQ HOSP IP/OBS MODERATE 35: CPT | Performed by: HOSPITALIST

## 2019-05-01 PROCEDURE — 94640 AIRWAY INHALATION TREATMENT: CPT | Mod: 76

## 2019-05-01 PROCEDURE — 99207 ZZC CDG-MDM COMPONENT: MEETS LOW - DOWN CODED: CPT | Performed by: HOSPITALIST

## 2019-05-01 PROCEDURE — 85025 COMPLETE CBC W/AUTO DIFF WBC: CPT | Performed by: HOSPITALIST

## 2019-05-01 PROCEDURE — 94799 UNLISTED PULMONARY SVC/PX: CPT

## 2019-05-01 PROCEDURE — 82533 TOTAL CORTISOL: CPT | Performed by: HOSPITALIST

## 2019-05-01 PROCEDURE — 40000275 ZZH STATISTIC RCP TIME EA 10 MIN

## 2019-05-01 PROCEDURE — 12000000 ZZH R&B MED SURG/OB

## 2019-05-01 PROCEDURE — 25800030 ZZH RX IP 258 OP 636: Performed by: HOSPITALIST

## 2019-05-01 PROCEDURE — 25000132 ZZH RX MED GY IP 250 OP 250 PS 637: Performed by: HOSPITALIST

## 2019-05-01 PROCEDURE — 25000125 ZZHC RX 250: Performed by: HOSPITALIST

## 2019-05-01 PROCEDURE — 80053 COMPREHEN METABOLIC PANEL: CPT | Performed by: HOSPITALIST

## 2019-05-01 RX ORDER — HYDROCORTISONE 20 MG/1
40 TABLET ORAL DAILY
Status: DISCONTINUED | OUTPATIENT
Start: 2019-05-02 | End: 2019-05-04 | Stop reason: HOSPADM

## 2019-05-01 RX ORDER — HYDROCORTISONE 20 MG/1
20 TABLET ORAL EVERY 24 HOURS
Status: DISCONTINUED | OUTPATIENT
Start: 2019-05-02 | End: 2019-05-04 | Stop reason: HOSPADM

## 2019-05-01 RX ADMIN — GUAIFENESIN AND DEXTROMETHORPHAN 10 ML: 100; 10 SYRUP ORAL at 20:14

## 2019-05-01 RX ADMIN — IPRATROPIUM BROMIDE AND ALBUTEROL SULFATE 3 ML: .5; 3 SOLUTION RESPIRATORY (INHALATION) at 22:37

## 2019-05-01 RX ADMIN — ACETAMINOPHEN 650 MG: 325 TABLET, FILM COATED ORAL at 21:55

## 2019-05-01 RX ADMIN — GUAIFENESIN AND DEXTROMETHORPHAN 10 ML: 100; 10 SYRUP ORAL at 14:29

## 2019-05-01 RX ADMIN — SODIUM CHLORIDE: 9 INJECTION, SOLUTION INTRAVENOUS at 04:59

## 2019-05-01 RX ADMIN — OXYCODONE HYDROCHLORIDE AND ACETAMINOPHEN 1 TABLET: 5; 325 TABLET ORAL at 18:04

## 2019-05-01 RX ADMIN — LEVOTHYROXINE SODIUM 200 MCG: 100 TABLET ORAL at 06:40

## 2019-05-01 RX ADMIN — AZITHROMYCIN MONOHYDRATE 250 MG: 500 INJECTION, POWDER, LYOPHILIZED, FOR SOLUTION INTRAVENOUS at 12:46

## 2019-05-01 RX ADMIN — CEFTRIAXONE SODIUM 2 G: 2 INJECTION, POWDER, FOR SOLUTION INTRAMUSCULAR; INTRAVENOUS at 11:50

## 2019-05-01 RX ADMIN — HYDROCORTISONE SODIUM SUCCINATE 25 MG: 100 INJECTION, POWDER, FOR SOLUTION INTRAMUSCULAR; INTRAVENOUS at 21:54

## 2019-05-01 RX ADMIN — OXYCODONE HYDROCHLORIDE AND ACETAMINOPHEN 1 TABLET: 5; 325 TABLET ORAL at 08:24

## 2019-05-01 RX ADMIN — GUAIFENESIN AND DEXTROMETHORPHAN 10 ML: 100; 10 SYRUP ORAL at 02:19

## 2019-05-01 RX ADMIN — GUAIFENESIN AND DEXTROMETHORPHAN 10 ML: 100; 10 SYRUP ORAL at 10:05

## 2019-05-01 RX ADMIN — IPRATROPIUM BROMIDE AND ALBUTEROL SULFATE 3 ML: .5; 3 SOLUTION RESPIRATORY (INHALATION) at 02:27

## 2019-05-01 RX ADMIN — IPRATROPIUM BROMIDE AND ALBUTEROL SULFATE 3 ML: .5; 3 SOLUTION RESPIRATORY (INHALATION) at 08:40

## 2019-05-01 RX ADMIN — HYDROCORTISONE SODIUM SUCCINATE 50 MG: 100 INJECTION, POWDER, FOR SOLUTION INTRAMUSCULAR; INTRAVENOUS at 06:40

## 2019-05-01 RX ADMIN — HYDROCORTISONE SODIUM SUCCINATE 25 MG: 100 INJECTION, POWDER, FOR SOLUTION INTRAMUSCULAR; INTRAVENOUS at 14:27

## 2019-05-01 RX ADMIN — ACETAMINOPHEN 650 MG: 325 TABLET, FILM COATED ORAL at 14:29

## 2019-05-01 ASSESSMENT — ACTIVITIES OF DAILY LIVING (ADL)
ADLS_ACUITY_SCORE: 13
ADLS_ACUITY_SCORE: 15
ADLS_ACUITY_SCORE: 13
ADLS_ACUITY_SCORE: 13

## 2019-05-01 ASSESSMENT — MIFFLIN-ST. JEOR: SCORE: 1699.88

## 2019-05-01 NOTE — PLAN OF CARE
DATE & TIME: 5/1 7-3pm  ORIENTATION: AOx4  BEHAVIOR & AGGRESSION TOOL COLOR: Green  CIWA SCORE: NA  ABNL VS/O2: VSS - Infrequent cough, congested nonproductive.  MOBILITY: Up SBA/walker uses urinal at bedside.  PAIN MANAGMENT: Percocet given X1 for sternal pain rated 7-8/10, MD aware of this pain which is worse with coughing, no concern for heart.   DIET: Regular  BOWEL/BLADDER: Uses urinal at bedside, last BM this AM per pt.  ABNL LAB/BG: D-dimer 0.7, US negative for DVT  DRAIN/DEVICES: Right arm S.L  TELEMETRY RHYTHM: NA  SKIN: Intact  TESTS/PROCEDURES:   D/C DAY/GOALS/PLACE: Pending  OTHER IMPORTANT INFO:

## 2019-05-01 NOTE — PROGRESS NOTES
[facility] RCAT Note    Date: 4/30/19  Admission Diagnosis: Pneumonia  Pulmonary History: NA  Home Nebulizer/ MDI Use: MDI  Home Oxygen Use: NA  Acuity Level (from RT Assessment flow sheet): 3    Aerosol Therapy Initiated: Nebs prn      Pulmonary Hygiene Initiated: NA      Volume Expansion Therapy Initiated:      Current Oxygen Requirement: None  Current SpO2: 97%    Re-evaluation date: 5/3/19    See 'RT Assessments' flow sheet for patient assessment scoring and Acuity Level Details.

## 2019-05-01 NOTE — PLAN OF CARE
DATE & TIME: 5/1 0340  ORIENTATION: AOx4  BEHAVIOR & AGGRESSION TOOL COLOR: Green  CIWA SCORE: NA  ABNL VS/O2: VSS - Infrequent cough, congested nonproductive.  MOBILITY: Up SBA, uses urinal at bedside.  PAIN MANAGMENT: Percocet for sternal pain rated 7-8/10, MD aware of this pain which is worse with coughing, no concern for heart.   DIET: Low Na, No caffeine  BOWEL/BLADDER: Uses urinal at bedside, last BM this AM per pt.  ABNL LAB/BG: D-dimer 0.7, US negative for DVT  DRAIN/DEVICES: R PIV NS @ 75ml/hr  TELEMETRY RHYTHM: NSR  SKIN: Intact  TESTS/PROCEDURES:   D/C DAY/GOALS/PLACE: Pending  OTHER IMPORTANT INFO:

## 2019-05-01 NOTE — PROGRESS NOTES
[facility]    Medicine Progress Note - Hospitalist Service       Date of Admission:  4/30/2019  Assessment & Plan      Braden Izaguirre is a 38 year old male who presented with a cough and was admitted for pneumonia due to multiple comorbidities.    Right lower lobe community-acquired pneumonia  Improving with antibiotics, continue ceftriaxone and azithromycin     Presyncope/dizziness  Resolved     Status post permanent pacemaker   Stable, discontinue telemetry     History of drug use   Urine toxicology positive for cocaine and cannabinoids   No sign of intoxication or withdrawal today  Consider CD consult if patient willing    Thrombocytopenia  May be chronic immune thrombocytopenia- count is stable   Platelet Count   Date Value Ref Range Status   05/01/2019 124 (L) 150 - 450 10e9/L Final     Mild acute kidney injury, probably prerenal   Creatinine <1 today  Stop intravenous fluid     Positive d-dimer  CT and ultrasound negative for venous thromboembolism     H/o Cushings due to pituitary adenoma, resected  Patient maintained on hydrocortisone prior to admission.   Currently on intravenous hydrocortisone- taper today and resume usual oral dose tomorrow    Hypothyroidism:   Continue prior to admission levothyroxine.    Diet: Combination Diet Low Saturated Fat Na <2400mg Diet, No Caffeine Diet    DVT Prophylaxis: Pneumatic Compression Devices  Herndon Catheter: not present  Code Status: Full Code      Disposition Plan   Expected discharge: Tomorrow, recommended to prior living arrangement once antibiotic plan established.  Entered: Hardeep Martinez MD 05/01/2019, 11:48 AM       The patient's care was discussed with the Patient.    Hardeep Martinez MD  Hospitalist Service  [facility]    ______________________________________________________________________    Interval History   Some non-productive cough, no pleuritic chest pain.  He is not dizzy today.    Data reviewed today: I  reviewed all medications, new labs and imaging results over the last 24 hours. I personally reviewed no images or EKG's today.    Physical Exam   Vital Signs: Temp: 98.1  F (36.7  C) Temp src: Oral BP: 123/80 Pulse: 95   Resp: 18 SpO2: 97 % O2 Device: None (Room air) Oxygen Delivery: 2 LPM  Weight: 190 lbs 4.11 oz  Constitutional: awake, alert, cooperative, no apparent distress, and appears stated age  Respiratory: No increased work of breathing, good air exchange, clear to auscultation bilaterally, no crackles or wheezing  Cardiovascular:  regular rate and rhythm, normal S1 and S2, no S3 or S4, and no murmur noted  GI: normal bowel sounds, soft, non-distended, non-tender  Skin: no rashes and no jaundice  Neurologic: Awake, alert, oriented to name, place and time.  Cranial nerves II-XII are grossly intact.  Motor is 5 out of 5 bilaterally.  Neuropsychiatric: General: normal, calm and normal eye contact    Data   Recent Labs   Lab 05/01/19  0900 04/30/19  0926   WBC 6.9 9.0   HGB 11.3* 14.1   MCV 95 94   * 131*    140   POTASSIUM 3.3* 3.4   CHLORIDE 109 106   CO2 23 27   BUN 10 15   CR 0.93 1.28*   ANIONGAP 10 7   DIGNA 7.9* 8.6   * 91   ALBUMIN 3.1*  --    PROTTOTAL 5.8*  --    BILITOTAL 0.2  --    ALKPHOS 61  --    ALT 37  --    AST 26  --      Recent Results (from the past 24 hour(s))   US Lower Extremity Venous Duplex Bilateral    Narrative    VENOUS ULTRASOUND BOTH LEGS  4/30/2019 3:06 PM     HISTORY: Rule out deep venous thrombosis. Elevated D-dimer.    COMPARISON: None.    FINDINGS: Examination of the deep veins with graded compression and  color flow Doppler with spectral wave form analysis was performed.   There is no evidence for deep venous thrombosis in the lower  extremities.      Impression    IMPRESSION: No evidence of deep venous thrombosis.

## 2019-05-02 LAB
ANION GAP SERPL CALCULATED.3IONS-SCNC: 7 MMOL/L (ref 3–14)
BACTERIA SPEC CULT: NORMAL
BUN SERPL-MCNC: 7 MG/DL (ref 7–30)
CALCIUM SERPL-MCNC: 8.3 MG/DL (ref 8.5–10.1)
CHLORIDE SERPL-SCNC: 109 MMOL/L (ref 94–109)
CO2 SERPL-SCNC: 27 MMOL/L (ref 20–32)
CREAT SERPL-MCNC: 1.06 MG/DL (ref 0.66–1.25)
GFR SERPL CREATININE-BSD FRML MDRD: 87 ML/MIN/{1.73_M2}
GLUCOSE SERPL-MCNC: 86 MG/DL (ref 70–99)
POTASSIUM SERPL-SCNC: 3.7 MMOL/L (ref 3.4–5.3)
SODIUM SERPL-SCNC: 143 MMOL/L (ref 133–144)
SPECIMEN SOURCE: NORMAL

## 2019-05-02 PROCEDURE — 25000132 ZZH RX MED GY IP 250 OP 250 PS 637: Performed by: HOSPITALIST

## 2019-05-02 PROCEDURE — 40000275 ZZH STATISTIC RCP TIME EA 10 MIN

## 2019-05-02 PROCEDURE — 25000128 H RX IP 250 OP 636: Performed by: HOSPITALIST

## 2019-05-02 PROCEDURE — 94640 AIRWAY INHALATION TREATMENT: CPT | Mod: 76

## 2019-05-02 PROCEDURE — 25000125 ZZHC RX 250: Performed by: HOSPITALIST

## 2019-05-02 PROCEDURE — 94640 AIRWAY INHALATION TREATMENT: CPT

## 2019-05-02 PROCEDURE — 36415 COLL VENOUS BLD VENIPUNCTURE: CPT | Performed by: HOSPITALIST

## 2019-05-02 PROCEDURE — 12000000 ZZH R&B MED SURG/OB

## 2019-05-02 PROCEDURE — 80048 BASIC METABOLIC PNL TOTAL CA: CPT | Performed by: HOSPITALIST

## 2019-05-02 PROCEDURE — 99232 SBSQ HOSP IP/OBS MODERATE 35: CPT | Performed by: HOSPITALIST

## 2019-05-02 PROCEDURE — 25800030 ZZH RX IP 258 OP 636: Performed by: HOSPITALIST

## 2019-05-02 PROCEDURE — 94799 UNLISTED PULMONARY SVC/PX: CPT

## 2019-05-02 RX ORDER — BUTALBITAL, ACETAMINOPHEN AND CAFFEINE 50; 325; 40 MG/1; MG/1; MG/1
1 TABLET ORAL EVERY 4 HOURS PRN
Status: DISCONTINUED | OUTPATIENT
Start: 2019-05-02 | End: 2019-05-04 | Stop reason: HOSPADM

## 2019-05-02 RX ADMIN — ACETAMINOPHEN 650 MG: 325 TABLET, FILM COATED ORAL at 06:00

## 2019-05-02 RX ADMIN — OXYCODONE HYDROCHLORIDE AND ACETAMINOPHEN 1 TABLET: 5; 325 TABLET ORAL at 20:46

## 2019-05-02 RX ADMIN — GUAIFENESIN AND DEXTROMETHORPHAN 10 ML: 100; 10 SYRUP ORAL at 16:40

## 2019-05-02 RX ADMIN — GUAIFENESIN AND DEXTROMETHORPHAN 10 ML: 100; 10 SYRUP ORAL at 20:46

## 2019-05-02 RX ADMIN — HYDROCORTISONE 40 MG: 20 TABLET ORAL at 08:21

## 2019-05-02 RX ADMIN — LEVOTHYROXINE SODIUM 200 MCG: 100 TABLET ORAL at 06:36

## 2019-05-02 RX ADMIN — IPRATROPIUM BROMIDE AND ALBUTEROL SULFATE 3 ML: .5; 3 SOLUTION RESPIRATORY (INHALATION) at 20:46

## 2019-05-02 RX ADMIN — HYDROCORTISONE 20 MG: 20 TABLET ORAL at 16:40

## 2019-05-02 RX ADMIN — AZITHROMYCIN MONOHYDRATE 250 MG: 500 INJECTION, POWDER, LYOPHILIZED, FOR SOLUTION INTRAVENOUS at 11:54

## 2019-05-02 RX ADMIN — GUAIFENESIN AND DEXTROMETHORPHAN 10 ML: 100; 10 SYRUP ORAL at 05:56

## 2019-05-02 RX ADMIN — CEFTRIAXONE SODIUM 2 G: 2 INJECTION, POWDER, FOR SOLUTION INTRAMUSCULAR; INTRAVENOUS at 11:17

## 2019-05-02 RX ADMIN — OXYCODONE HYDROCHLORIDE AND ACETAMINOPHEN 1 TABLET: 5; 325 TABLET ORAL at 11:16

## 2019-05-02 RX ADMIN — IPRATROPIUM BROMIDE AND ALBUTEROL SULFATE 3 ML: .5; 3 SOLUTION RESPIRATORY (INHALATION) at 11:17

## 2019-05-02 RX ADMIN — OXYCODONE HYDROCHLORIDE AND ACETAMINOPHEN 1 TABLET: 5; 325 TABLET ORAL at 16:40

## 2019-05-02 RX ADMIN — GUAIFENESIN AND DEXTROMETHORPHAN 10 ML: 100; 10 SYRUP ORAL at 11:18

## 2019-05-02 RX ADMIN — IPRATROPIUM BROMIDE AND ALBUTEROL SULFATE 3 ML: .5; 3 SOLUTION RESPIRATORY (INHALATION) at 16:47

## 2019-05-02 ASSESSMENT — ACTIVITIES OF DAILY LIVING (ADL)
ADLS_ACUITY_SCORE: 15
ADLS_ACUITY_SCORE: 15
ADLS_ACUITY_SCORE: 13
ADLS_ACUITY_SCORE: 15
ADLS_ACUITY_SCORE: 13
ADLS_ACUITY_SCORE: 13

## 2019-05-02 ASSESSMENT — MIFFLIN-ST. JEOR: SCORE: 1700.88

## 2019-05-02 NOTE — PLAN OF CARE
DATE & TIME:5/2/19 Day Shift   ORIENTATION: Alert and Oriented x4  BEHAVIOR & AGGRESSION TOOL COLOR: Green  CIWA SCORE: NA  ABNL VS/O2: VSS  MOBILITY: Independent  PAIN MANAGMENT: Percocet, tylenol, and robitussin given for pain/cough   DIET: Reg  BOWEL/BLADDER: continent   ABNL LAB/BG: K+ 4.0 in AM  DRAIN/DEVICES: PIV right arm, saline locked, MD note states stop IVF   TELEMETRY RHYTHM: NA  SKIN: Intact  TESTS/PROCEDURES: NA  D/C DAY/GOALS/PLACE: Pending   OTHER IMPORTANT INFO:

## 2019-05-02 NOTE — PLAN OF CARE
DATE & TIME:5/2/19  ORIENTATION: Alert and Oriented x4  BEHAVIOR & AGGRESSION TOOL COLOR: Green  CIWA SCORE: NA  ABNL VS/O2: VSS  MOBILITY: SBA   PAIN MANAGMENT:   DIET: Reg  BOWEL/BLADDER: continent   ABNL LAB/BG: K+ 3.3 in AM, recheck in evening was 4.0, Hgb 11.3, Cortisol Serum 36.6, Ca 7.9  DRAIN/DEVICES: PIV right arm, saline locked, MD note states stop IVF   TELEMETRY RHYTHM: NA  SKIN: Intact  TESTS/PROCEDURES: NA  D/C DAY/GOALS/PLACE: Pending   OTHER IMPORTANT INFO:

## 2019-05-02 NOTE — PROGRESS NOTES
[facility]    Medicine Progress Note - Hospitalist Service       Date of Admission:  4/30/2019  Assessment & Plan      Braden Izaguirre is a 38 year old male who presented with a cough and was admitted for pneumonia due to multiple comorbidities.    Right lower lobe community-acquired pneumonia  Acute bronchospasm  Improving with antibiotics, continue ceftriaxone and azithromycin   Continue prn albuterol- if wheezing not resolved tomorrow consider prednisone     Headache   Prn Fiorcet    Presyncope/dizziness  Resolved     Status post permanent pacemaker   Stable    History of drug use   Urine toxicology positive for cocaine and cannabinoids   No sign of intoxication or withdrawal today  Consider CD consult if patient willing- not interested today    Thrombocytopenia  May be chronic immune thrombocytopenia- count is stable   Platelet Count   Date Value Ref Range Status   05/01/2019 124 (L) 150 - 450 10e9/L Final     Mild acute kidney injury, probably prerenal   Creatinine 1 today    Positive d-dimer  CT and ultrasound negative for venous thromboembolism     H/o Cushings due to pituitary adenoma, resected  Completed intravenous hydrocortisone and back on his oral dosing.    Hypothyroidism:   Continue prior to admission levothyroxine.    Diet: Regular Diet Adult    DVT Prophylaxis: Pneumatic Compression Devices  Herndon Catheter: not present  Code Status: Full Code      Disposition Plan   Expected discharge: Tomorrow, recommended to prior living arrangement once antibiotic plan established.  Entered: Hardeep Martinez MD 05/02/2019, 11:21 AM       The patient's care was discussed with the Patient.    Hardeep Martinez MD  Hospitalist Service  [facility]    ______________________________________________________________________    Interval History   Coughing, headache.     Data reviewed today: I reviewed all medications, new labs and imaging results over the last 24 hours. I  personally reviewed no images or EKG's today.    Physical Exam   Vital Signs: Temp: 100  F (37.8  C) Temp src: Oral BP: 118/69 Pulse: 90   Resp: 20 SpO2: 93 % O2 Device: None (Room air)    Weight: 190 lbs 7.64 oz  Constitutional: awake, alert, cooperative, no apparent distress, and appears stated age  Respiratory: No increased work of breathing, wheezing especially on the right  Cardiovascular:  regular rate and rhythm, normal S1 and S2, no S3 or S4, and no murmur noted  GI: normal bowel sounds, soft, non-distended, non-tender  Skin: no rashes and no jaundice  Neurologic: Awake, alert, oriented to name, place and time.  Cranial nerves II-XII are grossly intact.  Motor is 5 out of 5 bilaterally.  Neuropsychiatric: General: normal, calm and normal eye contact    Data   Recent Labs   Lab 05/02/19  0902 05/01/19  1855 05/01/19  0900 04/30/19  0926   WBC  --   --  6.9 9.0   HGB  --   --  11.3* 14.1   MCV  --   --  95 94   PLT  --   --  124* 131*     --  142 140   POTASSIUM 3.7 4.0 3.3* 3.4   CHLORIDE 109  --  109 106   CO2 27  --  23 27   BUN 7  --  10 15   CR 1.06  --  0.93 1.28*   ANIONGAP 7  --  10 7   DIGNA 8.3*  --  7.9* 8.6   GLC 86  --  194* 91   ALBUMIN  --   --  3.1*  --    PROTTOTAL  --   --  5.8*  --    BILITOTAL  --   --  0.2  --    ALKPHOS  --   --  61  --    ALT  --   --  37  --    AST  --   --  26  --      No results found for this or any previous visit (from the past 24 hour(s)).

## 2019-05-02 NOTE — PLAN OF CARE
DATE & TIME:05/1 1500-2330  ORIENTATION:A&O x 4  BEHAVIOR & AGGRESSION TOOL COLOR:Green , calm and cooperative for cares  CIWA SCORE:N/A  ABNL VS/O2:VSS on RA  MOBILITY: independent  PAIN MANAGMENT:  Narco x 1, tylenol x 1  DIET: Regular  BOWEL/BLADDER: Continent, voiding well  ABNL LAB/BG: Hbg 11.3, Latest K 4.0  DRAIN/DEVICES: PIV saline lock  TELEMETRY RHYTHM:N/A  SKIN: Intact  TESTS/PROCEDURES: None  D/C DAY/GOALS/PLACE: Anticipated discharge tomorrow  OTHER IMPORTANT INFO:

## 2019-05-03 PROCEDURE — 12000000 ZZH R&B MED SURG/OB

## 2019-05-03 PROCEDURE — 94640 AIRWAY INHALATION TREATMENT: CPT

## 2019-05-03 PROCEDURE — 94640 AIRWAY INHALATION TREATMENT: CPT | Mod: 76

## 2019-05-03 PROCEDURE — 40000275 ZZH STATISTIC RCP TIME EA 10 MIN

## 2019-05-03 PROCEDURE — 25000132 ZZH RX MED GY IP 250 OP 250 PS 637: Performed by: HOSPITALIST

## 2019-05-03 PROCEDURE — 99232 SBSQ HOSP IP/OBS MODERATE 35: CPT | Performed by: HOSPITALIST

## 2019-05-03 PROCEDURE — 25000125 ZZHC RX 250: Performed by: HOSPITALIST

## 2019-05-03 PROCEDURE — 25000131 ZZH RX MED GY IP 250 OP 636 PS 637: Performed by: HOSPITALIST

## 2019-05-03 RX ORDER — AZITHROMYCIN 250 MG/1
250 TABLET, FILM COATED ORAL DAILY
Status: DISCONTINUED | OUTPATIENT
Start: 2019-05-03 | End: 2019-05-04 | Stop reason: HOSPADM

## 2019-05-03 RX ORDER — MICONAZOLE NITRATE 20 MG/G
CREAM TOPICAL
Status: DISCONTINUED | OUTPATIENT
Start: 2019-05-03 | End: 2019-05-04 | Stop reason: HOSPADM

## 2019-05-03 RX ORDER — CEFPODOXIME PROXETIL 200 MG/1
200 TABLET, FILM COATED ORAL EVERY 12 HOURS SCHEDULED
Status: DISCONTINUED | OUTPATIENT
Start: 2019-05-03 | End: 2019-05-04 | Stop reason: HOSPADM

## 2019-05-03 RX ORDER — ALBUTEROL SULFATE 90 UG/1
2 AEROSOL, METERED RESPIRATORY (INHALATION) EVERY 6 HOURS PRN
Status: DISCONTINUED | OUTPATIENT
Start: 2019-05-03 | End: 2019-05-04 | Stop reason: HOSPADM

## 2019-05-03 RX ORDER — PREDNISONE 20 MG/1
40 TABLET ORAL DAILY
Status: DISCONTINUED | OUTPATIENT
Start: 2019-05-03 | End: 2019-05-04 | Stop reason: HOSPADM

## 2019-05-03 RX ADMIN — ACETAMINOPHEN 650 MG: 325 TABLET, FILM COATED ORAL at 18:10

## 2019-05-03 RX ADMIN — OXYCODONE HYDROCHLORIDE AND ACETAMINOPHEN 1 TABLET: 5; 325 TABLET ORAL at 06:09

## 2019-05-03 RX ADMIN — ACETAMINOPHEN 650 MG: 325 TABLET, FILM COATED ORAL at 13:36

## 2019-05-03 RX ADMIN — PREDNISONE 40 MG: 20 TABLET ORAL at 11:15

## 2019-05-03 RX ADMIN — ALBUTEROL SULFATE 2.5 MG: 2.5 SOLUTION RESPIRATORY (INHALATION) at 06:31

## 2019-05-03 RX ADMIN — HYDROCORTISONE 20 MG: 20 TABLET ORAL at 16:19

## 2019-05-03 RX ADMIN — IPRATROPIUM BROMIDE AND ALBUTEROL SULFATE 3 ML: .5; 3 SOLUTION RESPIRATORY (INHALATION) at 00:28

## 2019-05-03 RX ADMIN — HYDROCORTISONE 40 MG: 20 TABLET ORAL at 08:36

## 2019-05-03 RX ADMIN — GUAIFENESIN AND DEXTROMETHORPHAN 10 ML: 100; 10 SYRUP ORAL at 00:55

## 2019-05-03 RX ADMIN — ALBUTEROL SULFATE 2 PUFF: 90 AEROSOL, METERED RESPIRATORY (INHALATION) at 22:58

## 2019-05-03 RX ADMIN — AZITHROMYCIN MONOHYDRATE 250 MG: 250 TABLET ORAL at 11:15

## 2019-05-03 RX ADMIN — ALBUTEROL SULFATE 2 PUFF: 90 AEROSOL, METERED RESPIRATORY (INHALATION) at 16:58

## 2019-05-03 RX ADMIN — ACETAMINOPHEN 650 MG: 325 TABLET, FILM COATED ORAL at 22:28

## 2019-05-03 RX ADMIN — GUAIFENESIN AND DEXTROMETHORPHAN 10 ML: 100; 10 SYRUP ORAL at 22:28

## 2019-05-03 RX ADMIN — GUAIFENESIN AND DEXTROMETHORPHAN 10 ML: 100; 10 SYRUP ORAL at 14:17

## 2019-05-03 RX ADMIN — Medication 1 LOZENGE: at 22:28

## 2019-05-03 RX ADMIN — MICONAZOLE NITRATE: 20 CREAM TOPICAL at 20:35

## 2019-05-03 RX ADMIN — GUAIFENESIN AND DEXTROMETHORPHAN 10 ML: 100; 10 SYRUP ORAL at 06:09

## 2019-05-03 RX ADMIN — LEVOTHYROXINE SODIUM 200 MCG: 100 TABLET ORAL at 06:40

## 2019-05-03 RX ADMIN — OXYCODONE HYDROCHLORIDE AND ACETAMINOPHEN 1 TABLET: 5; 325 TABLET ORAL at 00:55

## 2019-05-03 RX ADMIN — GUAIFENESIN AND DEXTROMETHORPHAN 10 ML: 100; 10 SYRUP ORAL at 18:10

## 2019-05-03 RX ADMIN — CEFPODOXIME PROXETIL 200 MG: 200 TABLET, FILM COATED ORAL at 13:33

## 2019-05-03 RX ADMIN — CEFPODOXIME PROXETIL 200 MG: 200 TABLET, FILM COATED ORAL at 20:35

## 2019-05-03 ASSESSMENT — ACTIVITIES OF DAILY LIVING (ADL)
ADLS_ACUITY_SCORE: 13
ADLS_ACUITY_SCORE: 11
ADLS_ACUITY_SCORE: 13

## 2019-05-03 ASSESSMENT — MIFFLIN-ST. JEOR: SCORE: 1697.88

## 2019-05-03 NOTE — PROGRESS NOTES
[facility]    Medicine Progress Note - Hospitalist Service       Date of Admission:  4/30/2019  Assessment & Plan      Braden Izaguirre is a 38 year old male who presented with a cough and was admitted for pneumonia due to multiple comorbidities.    Right lower lobe community-acquired pneumonia  Acute bronchospasm  Improving with antibiotics, change to oral cefpodoxime and azithromycin  He is still wheezing today although not as much yesterday.  We will do a 5-day burst of prednisone 40 mg daily.  Change inhaled bronchodilators from nebulizer to inhaler.    Headache, improved  Continue Fiorcet as needed    Presyncope/dizziness  Resolved     Status post permanent pacemaker   Stable    History of drug use   Urine toxicology positive for cocaine and cannabinoids   No sign of intoxication or withdrawal today  Consider CD consult if patient willing- not interested     Thrombocytopenia  May be chronic immune thrombocytopenia- count is stable   Platelet Count   Date Value Ref Range Status   05/01/2019 124 (L) 150 - 450 10e9/L Final     Mild acute kidney injury, probably prerenal   Creatinine 1     Positive d-dimer  CT and ultrasound negative for venous thromboembolism     H/o Cushings due to pituitary adenoma, resected  Completed intravenous hydrocortisone and back on his oral dosing.    Hypothyroidism:   Continue prior to admission levothyroxine.    Diet: Regular Diet Adult    DVT Prophylaxis: Pneumatic Compression Devices  Herndon Catheter: not present  Code Status: Full Code      Disposition Plan   Expected discharge: May 4, recommended to prior living arrangement once antibiotic plan established.  Entered: Hardeep Martinez MD 05/03/2019, 1:20 PM       The patient's care was discussed with the Patient.    Hardeep Martinez MD  Hospitalist Service  [facility]    ______________________________________________________________________    Interval History   Feeling better today  overall.  Still having some productive cough.  Headache better.  Still little wheezy.     Data reviewed today: I reviewed all medications, new labs and imaging results over the last 24 hours. I personally reviewed no images or EKG's today.    Physical Exam   Vital Signs: Temp: 98  F (36.7  C) Temp src: Oral BP: 106/71 Pulse: 92   Resp: 18 SpO2: 94 % O2 Device: None (Room air)    Weight: 189 lbs 13.06 oz  Constitutional: awake, alert, cooperative, no apparent distress, and appears stated age  Respiratory: No increased work of breathing, wheezing is much less than yesterday although there are still mild bilateral wheezes  Cardiovascular:  regular rate and rhythm, normal S1 and S2, no S3 or S4, and no murmur noted  GI: normal bowel sounds, soft, non-distended, non-tender  Skin: no rashes and no jaundice  Neurologic: Awake, alert, oriented to name, place and time.  Cranial nerves II-XII are grossly intact.  Motor is 5 out of 5 bilaterally.  Neuropsychiatric: General: normal, calm and normal eye contact    Data   Recent Labs   Lab 05/02/19  0902 05/01/19  1855 05/01/19  0900 04/30/19  0926   WBC  --   --  6.9 9.0   HGB  --   --  11.3* 14.1   MCV  --   --  95 94   PLT  --   --  124* 131*     --  142 140   POTASSIUM 3.7 4.0 3.3* 3.4   CHLORIDE 109  --  109 106   CO2 27  --  23 27   BUN 7  --  10 15   CR 1.06  --  0.93 1.28*   ANIONGAP 7  --  10 7   DIGNA 8.3*  --  7.9* 8.6   GLC 86  --  194* 91   ALBUMIN  --   --  3.1*  --    PROTTOTAL  --   --  5.8*  --    BILITOTAL  --   --  0.2  --    ALKPHOS  --   --  61  --    ALT  --   --  37  --    AST  --   --  26  --      No results found for this or any previous visit (from the past 24 hour(s)).

## 2019-05-03 NOTE — PLAN OF CARE
.DATE & TIME: 5/3/19 7-3pm  ORIENTATION: A&Ox4   BEHAVIOR & AGGRESSION TOOL COLOR: Green   CIWA SCORE: NA   ABNL VS/O2: VSS, on RA. Freq productive cough. Prn nebs x1 today for comfort.   MOBILITY: Independent with walker.  PAIN MANAGMENT: PRN Tylenol and cough syrup for discomfort with cough.   DIET: Reg, good appetite and oral intake   BOWEL/BLADDER: WDL   ABNL LAB/BG:   DRAIN/DEVICES: IV SL   TELEMETRY RHYTHM: NA   SKIN: WDL   TESTS/PROCEDURES: NA   D/C DAY/GOALS/PLACE: Possible discharging home 5/4  OTHER IMPORTANT INFO: Crackle to RLL, occasional wheezing/nebs, changed to PO prednisone/PO zithromax

## 2019-05-03 NOTE — PLAN OF CARE
ORIENTATION: A&Ox4   BEHAVIOR & AGGRESSION TOOL COLOR: Green   CIWA SCORE: NA   ABNL VS/O2: VSS, on RA. Freq productive cough. Prn nebs x2 today for comfort.   MOBILITY: Independent with walker.  PAIN MANAGMENT: PRN Percocet and cough syrup for discomfort with cough.   DIET: Reg, good appetite and oral intake   BOWEL/BLADDER: WDL   ABNL LAB/BG:   DRAIN/DEVICES: IV SL   TELEMETRY RHYTHM: NA   SKIN: WDL   TESTS/PROCEDURES: NA   D/C DAY/GOALS/PLACE: 05.04 to home

## 2019-05-03 NOTE — PLAN OF CARE
DATE & TIME: 5/3/19 0200  ORIENTATION: Alert and Oriented x4  BEHAVIOR & AGGRESSION TOOL COLOR: Green  CIWA SCORE: NA  ABNL VS/O2: VSS on RA  MOBILITY: Independent using walker   PAIN MANAGMENT: Taking 1 tab of Percocet provides relief, taken x1  DIET: Reg  BOWEL/BLADDER: Continent   ABNL LAB/BG:   DRAIN/DEVICES: IV SL right arm, flushed well.  TELEMETRY RHYTHM: NA  SKIN: Intact  TESTS/PROCEDURES: NA  D/C DAY/GOALS/PLACE: discharge home on 5/4/19  OTHER IMPORTANT INFO: Patient using PRN nebs x1 and PRN cough syrup x1

## 2019-05-04 VITALS
TEMPERATURE: 98.2 F | DIASTOLIC BLOOD PRESSURE: 83 MMHG | HEART RATE: 77 BPM | WEIGHT: 189.6 LBS | OXYGEN SATURATION: 98 % | BODY MASS INDEX: 31.59 KG/M2 | SYSTOLIC BLOOD PRESSURE: 120 MMHG | RESPIRATION RATE: 18 BRPM | HEIGHT: 65 IN

## 2019-05-04 PROCEDURE — 25000131 ZZH RX MED GY IP 250 OP 636 PS 637: Performed by: HOSPITALIST

## 2019-05-04 PROCEDURE — 99238 HOSP IP/OBS DSCHRG MGMT 30/<: CPT | Performed by: HOSPITALIST

## 2019-05-04 PROCEDURE — 25000132 ZZH RX MED GY IP 250 OP 250 PS 637: Performed by: HOSPITALIST

## 2019-05-04 RX ORDER — ALBUTEROL SULFATE 90 UG/1
2 AEROSOL, METERED RESPIRATORY (INHALATION) EVERY 6 HOURS PRN
Start: 2019-05-04

## 2019-05-04 RX ORDER — PREDNISONE 20 MG/1
40 TABLET ORAL DAILY
Qty: 6 TABLET | Refills: 0 | Status: SHIPPED | OUTPATIENT
Start: 2019-05-05

## 2019-05-04 RX ORDER — CEFDINIR 300 MG/1
300 CAPSULE ORAL 2 TIMES DAILY
Qty: 5 CAPSULE | Refills: 0 | Status: SHIPPED | OUTPATIENT
Start: 2019-05-04

## 2019-05-04 RX ORDER — CEFPODOXIME PROXETIL 200 MG/1
200 TABLET, FILM COATED ORAL EVERY 12 HOURS
Qty: 5 TABLET | Refills: 0 | Status: SHIPPED | OUTPATIENT
Start: 2019-05-04 | End: 2019-05-04

## 2019-05-04 RX ORDER — GUAIFENESIN/DEXTROMETHORPHAN 100-10MG/5
10 SYRUP ORAL EVERY 4 HOURS PRN
Qty: 236 ML | Refills: 0 | Status: SHIPPED | OUTPATIENT
Start: 2019-05-04

## 2019-05-04 RX ADMIN — CEFPODOXIME PROXETIL 200 MG: 200 TABLET, FILM COATED ORAL at 08:41

## 2019-05-04 RX ADMIN — PREDNISONE 40 MG: 20 TABLET ORAL at 08:41

## 2019-05-04 RX ADMIN — LEVOTHYROXINE SODIUM 200 MCG: 100 TABLET ORAL at 06:40

## 2019-05-04 RX ADMIN — HYDROCORTISONE 40 MG: 20 TABLET ORAL at 08:41

## 2019-05-04 RX ADMIN — AZITHROMYCIN MONOHYDRATE 250 MG: 250 TABLET ORAL at 08:41

## 2019-05-04 RX ADMIN — ALBUTEROL SULFATE 2 PUFF: 90 AEROSOL, METERED RESPIRATORY (INHALATION) at 06:40

## 2019-05-04 RX ADMIN — GUAIFENESIN AND DEXTROMETHORPHAN 10 ML: 100; 10 SYRUP ORAL at 06:45

## 2019-05-04 RX ADMIN — ACETAMINOPHEN 650 MG: 325 TABLET, FILM COATED ORAL at 06:45

## 2019-05-04 ASSESSMENT — ACTIVITIES OF DAILY LIVING (ADL)
ADLS_ACUITY_SCORE: 11

## 2019-05-04 ASSESSMENT — MIFFLIN-ST. JEOR: SCORE: 1696.88

## 2019-05-04 NOTE — PLAN OF CARE
ORIENTATION: A&Ox4   BEHAVIOR & AGGRESSION TOOL COLOR: Green, calm cooperative  CIWA SCORE: NA   ABNL VS/O2: VSS, on RA. Infrequent productive cough, tx with robitussin. Prn nebs discontinued and replaced with inhaler-given x1.  MOBILITY: Independent with walker.   PAIN MANAGMENT: PRN Tylenol and cough syrup for discomfort with cough.   DIET: Reg, good appetite and oral intake   BOWEL/BLADDER: WDL, continent, up to bathroom  ABNL LAB/BG: WDL (hx drug use, urine positive for cocaine and cannabis, not interested in CD tx at this time per MD note)  DRAIN/DEVICES: IV SL   TELEMETRY RHYTHM: NA   SKIN: Rash appeared on bilateral calf post shower yesterday, ordered and tried miconazole cream which helped per pt, he wants to take this home with him at discharge.  TESTS/PROCEDURES: NA   D/C DAY/GOALS/PLACE: Possible discharging home 5/4 (tomorrow)  OTHER IMPORTANT INFO: Fine crackles/diminished to RLL, occasional wheezing, changed to PO prednisone/PO abx.

## 2019-05-04 NOTE — PLAN OF CARE
VSS. Adequate for discharge. All discharge instructions reviewed w/ pt, no further questions at this time. IV discontinued. All belongings and discharge meds sent w/ pt. Discharge to home w/ family.

## 2019-05-04 NOTE — DISCHARGE INSTRUCTIONS
Follow up with primary care provider, Pranav Pagan, within 14 days for hospital follow- up.  The following labs/tests are recommended: cbc, chem 8.  Chest X-ray in 1 month to follow up pneumonia.

## 2019-05-04 NOTE — PLAN OF CARE
ORIENTATION: A&Ox4   BEHAVIOR & AGGRESSION TOOL COLOR: Green, calm cooperative  CIWA SCORE: NA   ABNL VS/O2: VSS, on RA. Infrequent productive cough, tx with robitussin. Prn nebs discontinued and replaced with prn inhaler.  MOBILITY: Independent with walker.   PAIN MANAGMENT: PRN Tylenol and cough syrup for discomfort with cough.   DIET: Regular  BOWEL/BLADDER: WDL, continent, up to bathroom  ABNL LAB/BG: WDL  DRAIN/DEVICES: IV SL   TELEMETRY RHYTHM: NA   SKIN: Rash appeared on bilateral calf post shower yesterday, ordered and tried miconazole cream which helped per pt, he wants to take this home with him at discharge.  TESTS/PROCEDURES: NA   D/C DAY/GOALS/PLACE: Possible discharging home 5/4  OTHER IMPORTANT INFO: Fine crackles/diminished to RLL, occasional wheezing, changed to PO prednisone/PO abx.

## 2019-05-04 NOTE — DISCHARGE SUMMARY
[facility]  Hospitalist Discharge Summary       Date of Admission:  4/30/2019  Date of Discharge:  5/4/2019  Discharging Provider: Hardeep Martinez MD  Discharge Diagnoses   1. Right lower lobe community-acquired pneumonia  2. Acute bronchospasm due to #1  3. Acute tension headache  4. Presyncope and dizziness due to dehydration  5. Thrombocytopenia, possibly acute   6. Mild acute kidney injury, prerenal     History of     Cushings due to pituitary adenoma, resected    Status post permanent pacemaker     Hypothyroidism:     Follow-ups Needed After Discharge   Follow-up Appointments     Follow-up and recommended labs and tests       Follow up with primary care provider, Pranav Pagan, within 14 days for   hospital follow- up.  The following labs/tests are recommended: cbc, chem   8.  Chest X-ray in 1 month to follow up pneumonia.            Unresulted Labs Ordered in the Past 30 Days of this Admission     Date and Time Order Name Status Description    4/30/2019 1404 T4 free In process     4/30/2019 0924 Blood culture Preliminary     4/30/2019 0924 Blood culture Preliminary       These results will be followed up by primary care provider     Discharge Disposition   Discharged to home  Condition at discharge: Stable    Hospital Course   Braden Izaguirre is a 38 year old male who presented with a cough and was admitted for pneumonia due to multiple comorbidities.    Right lower lobe community-acquired pneumonia  Acute bronchospasm  Blood and sputum cultures were negative.  The patient was treated empirically with ceftriaxone and azithromycin.  He is completed azithromycin and will be discharged home on Cefdinir.  Recommend follow-up chest x-ray in a month.  He will also complete a 5-day prednisone burst for the wheezing.    Headache, resolved  Tension headache treated with Fioricet as needed    Presyncope/dizziness  Resolved -due to dehydration on admission    Status post permanent pacemaker    Stable    History of drug use   Urine toxicology positive for cocaine and cannabinoids   No sign of intoxication or withdrawal today    Thrombocytopenia  May be chronic immune thrombocytopenia- count is stable   Platelet Count   Date Value Ref Range Status   05/01/2019 124 (L) 150 - 450 10e9/L Final   Follow-up platelet count with primary care physician    Mild acute kidney injury, probably prerenal   Resolved with IV fluid    Positive d-dimer  CT and ultrasound negative for venous thromboembolism     H/o Cushings due to pituitary adenoma, resected  Completed intravenous hydrocortisone and back on his oral dosing.    Hypothyroidism:   Continue prior to admission levothyroxine.    Consultations This Hospital Stay   None    Code Status   Full Code    Time Spent on this Encounter   I, Hardeep Martinez, personally saw the patient today and spent less than or equal to 30 minutes discharging this patient.       Hardeep Martinez MD  [facility]  ______________________________________________________________________    Physical Exam   Vital Signs: Temp: (P) 98.2  F (36.8  C) Temp src: (P) Oral BP: (P) 120/83 Pulse: (P) 77 Heart Rate: 69 Resp: (P) 18 SpO2: (P) 98 % O2 Device: None (Room air)    Weight: 189 lbs 9.53 oz  Constitutional: awake, alert, cooperative, no apparent distress, and appears stated age  Respiratory: No increased work of breathing, no wheezing, mild vesicular breath sounds at the right base       Primary Care Physician   Pranav Pagan    Discharge Orders      Reason for your hospital stay    Pneumonia     Follow-up and recommended labs and tests     Follow up with primary care provider, Pranav Pagan, within 14 days for hospital follow- up.  The following labs/tests are recommended: cbc, chem 8.  Chest X-ray in 1 month to follow up pneumonia.     Activity    Your activity upon discharge: activity as tolerated     Diet    Follow this diet upon discharge:       Regular Diet Adult        Significant Results and Procedures     Most Recent 3 CBC's:  Recent Labs   Lab Test 05/01/19  0900 04/30/19  0926   WBC 6.9 9.0   HGB 11.3* 14.1   MCV 95 94   * 131*     Most Recent 3 BMP's:  Recent Labs   Lab Test 05/02/19  0902 05/01/19  1855 05/01/19  0900 04/30/19  0926     --  142 140   POTASSIUM 3.7 4.0 3.3* 3.4   CHLORIDE 109  --  109 106   CO2 27  --  23 27   BUN 7  --  10 15   CR 1.06  --  0.93 1.28*   ANIONGAP 7  --  10 7   DIGNA 8.3*  --  7.9* 8.6   GLC 86  --  194* 91     Most Recent 6 Bacteria Isolates From Any Culture (See EPIC Reports for Culture Details):  Recent Labs   Lab Test 04/30/19  2137 04/30/19  1003 04/30/19  0926   CULT Light growth  Normal toby   No growth after 4 days No growth after 4 days     Most Recent TSH and T4:  Recent Labs   Lab Test 04/30/19  1404   TSH 0.34*   T4 0.63*     Most Recent ESR & CRP:No lab results found.,   Results for orders placed or performed during the hospital encounter of 04/30/19   XR Chest 2 Views    Narrative    CHEST TWO VIEWS April 30, 2019 9:55 AM     HISTORY: 38-year-old patient with cough, fever, and hypoxia.    COMPARISON: None.       Impression    IMPRESSION: Heart size is normal. No pleural effusion, pneumothorax,  or abnormal area of consolidation. Dual lead pacemaker in the left  hemithorax.    SALONI PAGE MD   CT Chest Pulmonary Embolism w Contrast    Narrative    CT CHEST PULMONARY EMBOLISM WITH CONTRAST   4/30/2019 11:31 AM     HISTORY: Fever, hypoxia, normal chest x-ray, mildly elevated D-dimer.    TECHNIQUE: 70 mL Isovue-370. Radiation dose for this scan was reduced  using automated exposure control, adjustment of the mA and/or kV  according to patient size, or iterative reconstruction technique.    COMPARISON: Chest x-ray obtained same day.    FINDINGS: No evidence of pulmonary embolism or acute thoracic aortic  abnormality. Heart size is normal. No significant coronary  calcifications. There is a left-sided pacer device  present.    No pneumothorax. No pleural or pericardial effusion. There is abnormal  airspace and groundglass opacity in the right lower lobe. No  pathologically enlarged thoracic or axillary lymph nodes. No pulmonary  nodule or mass.      Impression    IMPRESSION:  1. Probable right lower lobe pneumonia. Aspiration pneumonitis is  another possibility.  2. No evidence of pulmonary embolism or acute thoracic aortic  abnormality.    WHITNEY JOHNSON MD   US Lower Extremity Venous Duplex Bilateral    Narrative    VENOUS ULTRASOUND BOTH LEGS  4/30/2019 3:06 PM     HISTORY: Rule out deep venous thrombosis. Elevated D-dimer.    COMPARISON: None.    FINDINGS: Examination of the deep veins with graded compression and  color flow Doppler with spectral wave form analysis was performed.   There is no evidence for deep venous thrombosis in the lower  extremities.      Impression    IMPRESSION: No evidence of deep venous thrombosis.    TAMY LIZAMA MD     Discharge Medications   Current Discharge Medication List      START taking these medications    Details   albuterol (PROAIR HFA/PROVENTIL HFA/VENTOLIN HFA) 108 (90 Base) MCG/ACT inhaler Inhale 2 puffs into the lungs every 6 hours as needed for wheezing    Associated Diagnoses: Community acquired pneumonia of right lung, unspecified part of lung      cefdinir (OMNICEF) 300 MG capsule Take 1 capsule (300 mg) by mouth 2 times daily  Qty: 5 capsule, Refills: 0    Associated Diagnoses: Community acquired pneumonia of right lung, unspecified part of lung      guaiFENesin-dextromethorphan (ROBITUSSIN DM) 100-10 MG/5ML syrup Take 10 mLs by mouth every 4 hours as needed for cough  Qty: 236 mL, Refills: 0    Associated Diagnoses: Community acquired pneumonia of right lung, unspecified part of lung      predniSONE (DELTASONE) 20 MG tablet Take 40 mg by mouth daily x3 days.  Qty: 6 tablet, Refills: 0    Comments: Please include the quantity of tablets and (strength) per dose in  sig.  Associated Diagnoses: Community acquired pneumonia of right lung, unspecified part of lung         CONTINUE these medications which have NOT CHANGED    Details   fluticasone (FLONASE) 50 MCG/ACT nasal spray Spray 1 spray into both nostrils daily as needed for rhinitis or allergies      hydrocortisone (CORTEF) 10 MG tablet Take four tablets (40 mg) by mouth every morning and two (20 mg) every afternoon at 4 pm.      ibuprofen (ADVIL/MOTRIN) 200 MG tablet Take 200 mg by mouth every 4 hours as needed for mild pain      levothyroxine (SYNTHROID/LEVOTHROID) 200 MCG tablet Take 200 mcg by mouth every morning (before breakfast)       testosterone cypionate (DEPO-TESTOSTERONE) 200 MG/ML injection Inject 200 mg into the muscle every 14 days         STOP taking these medications       oxyCODONE-acetaminophen (PERCOCET) 5-325 MG per tablet Comments:   Reason for Stopping:             Allergies   Allergies   Allergen Reactions     Mushroom Diarrhea and Rash

## 2019-05-06 LAB
BACTERIA SPEC CULT: NO GROWTH
BACTERIA SPEC CULT: NO GROWTH
Lab: NORMAL
Lab: NORMAL
SPECIMEN SOURCE: NORMAL
SPECIMEN SOURCE: NORMAL